# Patient Record
Sex: FEMALE | Race: WHITE | Employment: FULL TIME | URBAN - METROPOLITAN AREA
[De-identification: names, ages, dates, MRNs, and addresses within clinical notes are randomized per-mention and may not be internally consistent; named-entity substitution may affect disease eponyms.]

---

## 2017-07-02 ENCOUNTER — OFFICE VISIT (OUTPATIENT)
Dept: URGENT CARE | Facility: URGENT CARE | Age: 32
End: 2017-07-02
Payer: COMMERCIAL

## 2017-07-02 VITALS
HEART RATE: 95 BPM | BODY MASS INDEX: 20.49 KG/M2 | HEIGHT: 64 IN | SYSTOLIC BLOOD PRESSURE: 98 MMHG | RESPIRATION RATE: 16 BRPM | DIASTOLIC BLOOD PRESSURE: 70 MMHG | OXYGEN SATURATION: 98 % | TEMPERATURE: 98.5 F | WEIGHT: 120 LBS

## 2017-07-02 DIAGNOSIS — L01.00 IMPETIGO: Primary | ICD-10-CM

## 2017-07-02 PROCEDURE — 99213 OFFICE O/P EST LOW 20 MIN: CPT | Performed by: FAMILY MEDICINE

## 2017-07-02 RX ORDER — MUPIROCIN CALCIUM 20 MG/G
CREAM TOPICAL 3 TIMES DAILY
Qty: 15 G | Refills: 0 | Status: SHIPPED | OUTPATIENT
Start: 2017-07-02 | End: 2017-07-09

## 2017-07-02 NOTE — NURSING NOTE
"Chief Complaint   Patient presents with     Urgent Care     Derm Problem     scab on left side of nose not healing, has had it for about 3 weeks.        Initial BP 98/70  Pulse 95  Temp 98.5  F (36.9  C) (Oral)  Resp 16  Ht 5' 4\" (1.626 m)  Wt 120 lb (54.4 kg)  LMP 05/26/2017  SpO2 98%  Breastfeeding? No  BMI 20.6 kg/m2 Estimated body mass index is 20.6 kg/(m^2) as calculated from the following:    Height as of this encounter: 5' 4\" (1.626 m).    Weight as of this encounter: 120 lb (54.4 kg).  Medication Reconciliation: complete  "

## 2017-07-02 NOTE — MR AVS SNAPSHOT
After Visit Summary   7/2/2017    Gabriela Chaudhry    MRN: [id number]           Patient Information     Date Of Birth          1985        Visit Information        Provider Department      7/2/2017 10:30 AM Tara Ventura MD [facility]        Today's Diagnoses     Impetigo    -  1      Care Instructions      Understanding Impetigo  Impetigo is a common bacterial infection of the skin. It most often affects the face, arms, and legs. But it can appear on any part of the body. Anyone can have it, regardless of age. But it is most common in children. Impetigo is very contagious. This means it spreads easily to other people.  How to say it  [id number]   What causes impetigo?  Many types of bacteria live on normal, healthy skin. The bacteria usually don t cause problems. Impetigo happens when bacteria enter the skin through a scratch, break, sore, bite, or irritated spot. They then begin to grow out of control, leading to infection. There are two types of staphylococcus bacteria that cause impetigo. In certain cases, impetigo appears on skin that has no visible break. It may be more likely to occur on skin that has another skin problem, such as eczema. It may also be more common after a cold or other virus.  Symptoms of impetigo  Symptoms of this problem include:    Small, fluid-filled blisters on the skin that may itch, ooze, or crust    A yellow, honey-colored crust on the infected skin    Skin sores that spread with scratching    An itchy rash that spreads with scratching    Swollen lymph nodes  Treatment for impetigo  The goal is to treat the infection and prevent it from spreading to others.    You will likely be given an antibiotic to treat the infection. This may be a cream or ointment called muporicin to put on your skin. If the infection is severe or spreading, you may be given antibiotic medicine to take by mouth. Be sure to use this medicine as directed. Do not  stop using it until you are told to stop, even if your skin gets better. If you stop too soon, the infection may come back and be harder to treat.    Avoid scratching or picking at your sores. It may help to cover affected areas with a bandage.    To prevent spreading the infection, wash your hands often. Avoid sharing personal items, towels, clothes, pillows, and sheets with others. After each use, wash these items in hot water.    Clean the affected skin several times a day. Don t scrub. Instead, soak the area in warm, soapy water. This will help remove the crust that forms. For places that you can't soak, such as the face, place a clean, warm (not hot) washcloth on the affected area. Use a new washcloth and towel each time.  When to call your healthcare provider  Call your healthcare provider right away if you have any of these:    Fever of 100.4 F (38 C) or higher, or as directed    Increasing number of sores or spreading areas of redness after 2 days of treatment with antibiotics    Increasing swelling or pain    Increased amounts of fluid or pus coming from the sores    Unusual drowsiness, weakness, or change in behavior    Loss of appetite or vomiting   Date Last Reviewed: 5/1/2016 2000-2017 [employer]. [address] [place] [address], [place] [address]. All rights reserved. This information is not intended as a substitute for professional medical care. Always follow your healthcare professional's instructions.                Follow-ups after your visit        Your next 10 appointments already scheduled     Jul 03, 2017  5:00 PM CDT   Office Visit with JOEY Verdin CNM   [facility] [place]    [address] [place] [id number] [phone]           Bring a current list of meds and any records pertaining to this visit.  For Physicals, please bring immunization records and any forms needing to be filled out.  Please arrive 10 minutes  "early to complete paperwork.              Who to contact     If you have questions or need follow up information about today's clinic visit or your schedule please contact [facility] directly at [phone].  Normal or non-critical lab and imaging results will be communicated to you by MyChart, letter or phone within 4 business days after the clinic has received the results. If you do not hear from us within 7 days, please contact the clinic through MyChart or phone. If you have a critical or abnormal lab result, we will notify you by phone as soon as possible.  Submit refill requests through [employer] or call your pharmacy and they will forward the refill request to us. Please allow 3 business days for your refill to be completed.          Additional Information About Your Visit        [employer] Information     [employer] lets you send messages to your doctor, view your test results, renew your prescriptions, schedule appointments and more. To sign up, go to [email or web address] . Click on \"Log in\" on the left side of the screen, which will take you to the Welcome page. Then click on \"Sign up Now\" on the right side of the page.     You will be asked to enter the access code listed below, as well as some personal information. Please follow the directions to create your username and password.     Your access code is: [email or web address]  Expires: 2017 11:27 AM     Your access code will  in 90 days. If you need help or a new code, please call your [place] clinic or [phone].        Care EveryWhere ID     This is your Care EveryWhere ID. This could be used by other organizations to access your [place] medical records  [id number]        Your Vitals Were     Pulse Temperature Respirations Height Last Period Pulse Oximetry    95 98.5  F (36.9  C) (Oral) 16 5' 4\" (1.626 m) 2017 98%    Breastfeeding? BMI (Body Mass Index)                No 20.6 kg/m2           Blood Pressure from Last " 3 Encounters:   07/02/17 98/70   09/24/15 101/61   05/24/15 98/56    Weight from Last 3 Encounters:   07/02/17 120 lb (54.4 kg)   09/24/15 119 lb (54 kg)   05/24/15 126 lb (57.2 kg)              Today, you had the following     No orders found for display         Today's Medication Changes          These changes are accurate as of: 7/2/17 11:27 AM.  If you have any questions, ask your nurse or doctor.               Start taking these medicines.        Dose/Directions    mupirocin 2 % cream   Commonly known as:  BACTROBAN   Used for:  Impetigo   Started by:  Tara Ventura MD        Apply topically 3 times daily for 7 days   Quantity:  15 g   Refills:  0         Stop taking these medicines if you haven't already. Please contact your care team if you have questions.     amoxicillin 500 MG capsule   Commonly known as:  AMOXIL   Stopped by:  Tara Ventura MD           BACTRIM PO   Stopped by:  Tara Ventura MD                Where to get your medicines      These medications were sent to [id number] - [place], [place] - [address] AT [facility] [address]  [facility] [address], [place] [address]    Hours:  24-hours Phone:  [phone]     mupirocin 2 % cream                Primary Care Provider Office Phone #    [facility] [phone]       No address on file        Equal Access to Services     CORA CLEMONS AH: Hadii jennifer alvarez hadasho Socadyali, waaxda luqadaha, qaybta kaalmada adeegyada, belén mercado. So [facility] [phone].    ATENCIÓN: Si habla español, tiene a chang disposición servicios gratuitos de asistencia lingüística. Llame al [phone].    We comply with applicable federal civil rights laws and [place] laws. We do not discriminate on the basis of race, color, national origin, age, disability sex, sexual orientation or gender identity.            Thank you!     Thank you for choosing [facility]  for  your care. Our goal is always to provide you with excellent care. Hearing back from our patients is one way we can continue to improve our services. Please take a few minutes to complete the written survey that you may receive in the mail after your visit with us. Thank you!             Your Updated Medication List - Protect others around you: Learn how to safely use, store and throw away your medicines at www.disposemymeds.org.          This list is accurate as of: 7/2/17 11:27 AM.  Always use your most recent med list.                   Brand Name Dispense Instructions for use Diagnosis    * levonorgestrel-ethinyl estradiol 0.15-30 MG-MCG per tablet    NORDETTE    84 tablet    Take 1 tablet by mouth daily    Contraception       * levonorgestrel-ethinyl estradiol 0.1-20 MG-MCG per tablet    TAE KENLESSINA    84 tablet    Take 1 tablet by mouth daily    Dysmenorrhea       mupirocin 2 % cream    BACTROBAN    15 g    Apply topically 3 times daily for 7 days    Impetigo       omeprazole 40 MG capsule    priLOSEC    30 capsule    Take 1 capsule (40 mg) by mouth daily Take 30-60 minutes before a meal.    Esophageal reflux       * Notice:  This list has 2 medication(s) that are the same as other medications prescribed for you. Read the directions carefully, and ask your doctor or other care provider to review them with you.

## 2017-07-02 NOTE — PATIENT INSTRUCTIONS
Understanding Impetigo  Impetigo is a common bacterial infection of the skin. It most often affects the face, arms, and legs. But it can appear on any part of the body. Anyone can have it, regardless of age. But it is most common in children. Impetigo is very contagious. This means it spreads easily to other people.  How to say it  [id number]   What causes impetigo?  Many types of bacteria live on normal, healthy skin. The bacteria usually don t cause problems. Impetigo happens when bacteria enter the skin through a scratch, break, sore, bite, or irritated spot. They then begin to grow out of control, leading to infection. There are two types of staphylococcus bacteria that cause impetigo. In certain cases, impetigo appears on skin that has no visible break. It may be more likely to occur on skin that has another skin problem, such as eczema. It may also be more common after a cold or other virus.  Symptoms of impetigo  Symptoms of this problem include:    Small, fluid-filled blisters on the skin that may itch, ooze, or crust    A yellow, honey-colored crust on the infected skin    Skin sores that spread with scratching    An itchy rash that spreads with scratching    Swollen lymph nodes  Treatment for impetigo  The goal is to treat the infection and prevent it from spreading to others.    You will likely be given an antibiotic to treat the infection. This may be a cream or ointment called muporicin to put on your skin. If the infection is severe or spreading, you may be given antibiotic medicine to take by mouth. Be sure to use this medicine as directed. Do not stop using it until you are told to stop, even if your skin gets better. If you stop too soon, the infection may come back and be harder to treat.    Avoid scratching or picking at your sores. It may help to cover affected areas with a bandage.    To prevent spreading the infection, wash your hands often. Avoid sharing personal items, towels, clothes,  pillows, and sheets with others. After each use, wash these items in hot water.    Clean the affected skin several times a day. Don t scrub. Instead, soak the area in warm, soapy water. This will help remove the crust that forms. For places that you can't soak, such as the face, place a clean, warm (not hot) washcloth on the affected area. Use a new washcloth and towel each time.  When to call your healthcare provider  Call your healthcare provider right away if you have any of these:    Fever of 100.4 F (38 C) or higher, or as directed    Increasing number of sores or spreading areas of redness after 2 days of treatment with antibiotics    Increasing swelling or pain    Increased amounts of fluid or pus coming from the sores    Unusual drowsiness, weakness, or change in behavior    Loss of appetite or vomiting   Date Last Reviewed: 5/1/2016 2000-2017 [employer]. [address] [place] [address], [place] [address]. All rights reserved. This information is not intended as a substitute for professional medical care. Always follow your healthcare professional's instructions.

## 2017-07-02 NOTE — PROGRESS NOTES
"SUBJECTIVE:  Gabriela Chaudhry is a 32 year old female who presents to the clinic today for a rash.  Onset of rash was 3 week(s) ago.   Rash is gradual onset.  Location of the rash: nose. Scab not healing at all  Quality/symptoms of rash: assymptomatic   Symptoms are moderate and rash seems to be not changing over the course of time.  Previous history of a similar rash? No  Recent exposure history: none known    Associated symptoms include: nothing.    Past Medical History:   Diagnosis Date     Other and unspecified ovarian cyst 7/5/11    O[place]ian [place], [place] [facility] likely [place]     Current Outpatient Prescriptions   Medication Sig Dispense Refill     levonorgestrel-ethinyl estradiol (AVIANE,ALESSE,LESSINA) 0.1-20 MG-MCG per tablet Take 1 tablet by mouth daily 84 tablet 3     levonorgestrel-ethinyl estradiol (NORDETTE) 0.15-30 MG-MCG per tablet Take 1 tablet by mouth daily 84 tablet 0     omeprazole (PRILOSEC) 40 MG capsule Take 1 capsule (40 mg) by mouth daily Take 30-60 minutes before a meal. 30 capsule 1     Social History   Substance Use Topics     Smoking status: Never Smoker     Smokeless tobacco: Never Used     Alcohol use No       ROS:  CONSTITUTIONAL:NEGATIVE for fever, chills, change in weight  INTEGUMENTARY/SKIN: POSITIVE for rash on her nose  RESP:NEGATIVE for significant cough or SOB  CV: NEGATIVE for chest pain, palpitations or peripheral edema    EXAM:   BP 98/70  Pulse 95  Temp 98.5  F (36.9  C) (Oral)  Resp 16  Ht 5' 4\" (1.626 m)  Wt 120 lb (54.4 kg)  LMP 05/26/2017  SpO2 98%  Breastfeeding? No  BMI 20.6 kg/m2  GENERAL: alert, no acute distress.  SKIN: Rash description:    Distribution:Perinasal erythema, erosions, and   RESP: lungs clear to auscultation - no rales, rhonchi or wheezes  CV: regular rates and rhythm, normal S1 S2, no murmur noted    ASSESSMENT:  1. Impetigo  - mupirocin (BACTROBAN) 2 % cream; Apply topically 3 times daily for 7 days  Dispense: 15 g; Refill: " 0  -Follow-up with primary clinic if not improving        Tara Ventura MD  [facility]

## 2017-07-06 DIAGNOSIS — N94.6 DYSMENORRHEA: ICD-10-CM

## 2017-07-06 NOTE — TELEPHONE ENCOUNTER
levonorgestrel-ethinyl estradiol (AVIANE,ALEMARKE,LESSINA) 0.1-20 MG-MCG per tablet  Last Written Prescription Date:  2/29/16  Last Fill Quantity: 84,   # refills: 3  Last Office Visit with [facility] prescribing provider: 9/24/15  Future Office visit:    Next 5 appointments (look out 90 days)     Jul 18, 2017  5:00 PM CDT   Office Visit with Jacquie Steele CNM   [facility]    [address] [place] [id number] [phone]                   Routing refill request to provider for review/approval because:  Drug not on the [facility] refill protocol or controlled substance

## 2017-07-07 RX ORDER — LEVONORGESTREL/ETHIN.ESTRADIOL 0.1-0.02MG
1 TABLET ORAL DAILY
Qty: 28 TABLET | Refills: 0 | Status: SHIPPED | OUTPATIENT
Start: 2017-07-07 | End: 2017-07-29

## 2017-07-26 ENCOUNTER — HOSPITAL ENCOUNTER (EMERGENCY)
Facility: CLINIC | Age: 32
Discharge: HOME | End: 2017-07-27
Attending: EMERGENCY MEDICINE | Admitting: EMERGENCY MEDICINE
Payer: COMMERCIAL

## 2017-07-26 ENCOUNTER — APPOINTMENT (OUTPATIENT)
Dept: GENERAL RADIOLOGY | Facility: CLINIC | Age: 32
End: 2017-07-26
Attending: EMERGENCY MEDICINE
Payer: COMMERCIAL

## 2017-07-26 DIAGNOSIS — S00.33XA NASAL CONTUSION: ICD-10-CM

## 2017-07-26 DIAGNOSIS — S50.11XA CONTUSION OF FOREARM, RIGHT: ICD-10-CM

## 2017-07-26 DIAGNOSIS — S50.12XA: ICD-10-CM
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
## 2017-07-26 DIAGNOSIS — S20.219A CHEST WALL CONTUSION, UNSPECIFIED LATERALITY, INITIAL ENCOUNTER: ICD-10-CM
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
## 2017-07-26 PROCEDURE — 93005 ELECTROCARDIOGRAM TRACING: CPT | Performed by: EMERGENCY MEDICINE

## 2017-07-26 PROCEDURE — 93010 ELECTROCARDIOGRAM REPORT: CPT | Mod: Z6 | Performed by: EMERGENCY MEDICINE

## 2017-07-26 PROCEDURE — 99284 EMERGENCY DEPT VISIT MOD MDM: CPT | Mod: 25 | Performed by: EMERGENCY MEDICINE

## 2017-07-26 PROCEDURE — 99285 EMERGENCY DEPT VISIT HI MDM: CPT | Mod: 25 | Performed by: EMERGENCY MEDICINE

## 2017-07-26 PROCEDURE — 36415 COLL VENOUS BLD VENIPUNCTURE: CPT | Performed by: EMERGENCY MEDICINE

## 2017-07-26 PROCEDURE — 71020 XR CHEST 2 VW: CPT

## 2017-07-26 PROCEDURE — 25000132 ZZH RX MED GY IP 250 OP 250 PS 637: Performed by: EMERGENCY MEDICINE

## 2017-07-26 RX ORDER — HYDROCODONE BITARTRATE AND ACETAMINOPHEN 5; 325 MG/1; MG/1
1-2 TABLET ORAL EVERY 4 HOURS PRN
Qty: 15 TABLET | Refills: 0 | Status: SHIPPED | OUTPATIENT
Start: 2017-07-26 | End: 2018-10-14

## 2017-07-26 RX ORDER — HYDROCODONE BITARTRATE AND ACETAMINOPHEN 5; 325 MG/1; MG/1
1 TABLET ORAL ONCE
Status: COMPLETED | OUTPATIENT
Start: 2017-07-26 | End: 2017-07-26

## 2017-07-26 RX ORDER — IBUPROFEN 600 MG/1
600 TABLET, FILM COATED ORAL ONCE
Status: COMPLETED | OUTPATIENT
Start: 2017-07-26 | End: 2017-07-26

## 2017-07-26 RX ORDER — IBUPROFEN 600 MG/1
600 TABLET, FILM COATED ORAL EVERY 8 HOURS PRN
Qty: 20 TABLET | Refills: 0 | Status: SHIPPED | OUTPATIENT
Start: 2017-07-26 | End: 2021-04-06

## 2017-07-26 RX ADMIN — IBUPROFEN 600 MG: 600 TABLET ORAL at 23:01

## 2017-07-26 RX ADMIN — HYDROCODONE BITARTRATE AND ACETAMINOPHEN 1 TABLET: 5; 325 TABLET ORAL at 23:01

## 2017-07-26 ASSESSMENT — ENCOUNTER SYMPTOMS
NECK PAIN: 1
ABDOMINAL PAIN: 1
SHORTNESS OF BREATH: 0
FEVER: 0

## 2017-07-26 NOTE — ED AVS SNAPSHOT
[facility], Emergency Department    [address]    [facility] [address]    Phone:  [phone]    Fax:  [phone]                                       Gabriela Chaudhry   MRN: [id number]    Department:  [facility], Emergency Department   Date of Visit:  7/26/2017           After Visit Summary Signature Page     I have received my discharge instructions, and my questions have been answered. I have discussed any challenges I see with this plan with the nurse or doctor.    ..........................................................................................................................................  Patient/Patient Representative Signature      ..........................................................................................................................................  Patient Representative Print Name and Relationship to Patient    ..................................................               ................................................  Date                                            Time    ..........................................................................................................................................  Reviewed by Signature/Title    ...................................................              ..............................................  Date                                                            Time
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
## 2017-07-27 VITALS
RESPIRATION RATE: 16 BRPM | HEART RATE: 74 BPM | SYSTOLIC BLOOD PRESSURE: 110 MMHG | TEMPERATURE: 99.1 F | OXYGEN SATURATION: 100 % | DIASTOLIC BLOOD PRESSURE: 69 MMHG

## 2017-07-27 LAB — INTERPRETATION ECG - MUSE: NORMAL

## 2017-07-27 NOTE — DISCHARGE INSTRUCTIONS
You will be sore for the next few days  Use pain medication as needed  Follow-up and your clinic or return to the ER if any problems or concerns
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
## 2017-07-29 ENCOUNTER — NURSE TRIAGE (OUTPATIENT)
Dept: NURSING | Facility: CLINIC | Age: 32
End: 2017-07-29

## 2017-07-29 ENCOUNTER — HOSPITAL ENCOUNTER (EMERGENCY)
Facility: CLINIC | Age: 32
Discharge: HOME | End: 2017-07-29
Attending: EMERGENCY MEDICINE | Admitting: EMERGENCY MEDICINE
Payer: COMMERCIAL

## 2017-07-29 VITALS
DIASTOLIC BLOOD PRESSURE: 52 MMHG | SYSTOLIC BLOOD PRESSURE: 106 MMHG | TEMPERATURE: 98.2 F | OXYGEN SATURATION: 99 % | BODY MASS INDEX: 18.8 KG/M2 | RESPIRATION RATE: 16 BRPM | HEART RATE: 69 BPM | WEIGHT: 109.5 LBS
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
## 2017-07-29 DIAGNOSIS — T23.271S: ICD-10-CM

## 2017-07-29 DIAGNOSIS — T23.271A: ICD-10-CM

## 2017-07-29 DIAGNOSIS — T23.272D: ICD-10-CM

## 2017-07-29 DIAGNOSIS — T23.272A: ICD-10-CM
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
## 2017-07-29 PROCEDURE — 99282 EMERGENCY DEPT VISIT SF MDM: CPT | Performed by: EMERGENCY MEDICINE

## 2017-07-29 PROCEDURE — 99282 EMERGENCY DEPT VISIT SF MDM: CPT | Mod: Z6 | Performed by: EMERGENCY MEDICINE

## 2017-07-29 RX ORDER — LEVONORGESTREL AND ETHINYL ESTRADIOL 0.15-0.03
1 KIT ORAL DAILY
COMMUNITY
End: 2021-04-06

## 2017-07-29 ASSESSMENT — ENCOUNTER SYMPTOMS
SHORTNESS OF BREATH: 0
FEVER: 0
ABDOMINAL PAIN: 0
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
## 2017-07-29 NOTE — DISCHARGE INSTRUCTIONS
Second-Degree Burn  A burn occurs when skin is exposed to too much heat, sun, or harsh chemicals. A second-degree burn (partial-thickness burn) is deeper than a first-degree burn (superficial burn). It usually causes a blister to form. The blister may remain intact and gradually go away on its own. Or it may break open. The goal of treatment is to relieve pain and stop infection while the burn heals.  Home care  Use pain medicine as directed. If no pain medicine was prescribed, you may use over-the-counter medicine to control pain. If you have chronic liver or kidney disease, talk with your healthcare provider before using acetaminophen or ibuprofen. Also talk with your provider if you've had a stomach ulcer or GI bleeding.  General care    On the first day, you may put a cool compress on the wound to ease pain. A cool compress is a small towel soaked in cool water.    If you were sent home with the blister intact, don't break the blister. The risk for infection is greater if the blister breaks. If a bandage was applied, change it once a day, unless told otherwise. If the bandage becomes wet or soiled, change it as soon as you can.    Sometimes an infection may occur even with proper treatment. Check the burn daily for the signs of infection listed below.    Eat more calories and protein until your wound is healed.    Wear a hat, sunscreen, and long sleeves while in the sun to protect the skin.    Don't pick or scratch at the wound. Use over-the-counter medicines like diphenhydramine for itching.    Avoid tight-fitting clothes.  To change a bandage:    Wash your hands.    Take off the old bandage. If the bandage sticks, soak it off under warm running water.    Once the bandage is off, gently wash the burn area with mild soap and warm water to remove any cream, ointment, ooze, or scab. You may do this in a sink, under a tub faucet, or in the shower. Rinse off the soap and gently pat dry with a clean towel.    Check  for signs of infection listed below.    Put any prescribed antibiotic cream or ointment on the wound.    Cover the burn with nonstick gauze. Then wrap it with the bandage material.  Follow-up care  Follow up with your healthcare provider, or as advised.  When to seek medical advice  Call your healthcare provider right away if you have any of these signs of infection:    Fever of 100.4 F (38 C) or higher, or as directed by your healthcare provider    Pain that gets worse    Redness or swelling that gets worse    Pus comes from the burn    Red streaks in your skin coming from the burn    Wound doesn't appear to be healing    Nausea or vomiting   Date Last Reviewed: 1/1/2017 2000-2017 [employer]. [address] [place] [address], [place] [id number]. All rights reserved. This information is not intended as a substitute for professional medical care. Always follow your healthcare professional's instructions.          Burn Wound: Wound Check, No Infection  Your burn is healing as expected.  Home care  Follow these guidelines when caring for yourself at home:    If a bandage was put on, you should change it once a day, unless told otherwise. If the bandage sticks, soak it off in warm water. A bandage left in place too long can make an infection worse.    Wash the area with soap and water to remove all cream, ointment, ooze, or scab. You may do this in a sink, under a tub faucet, or in the shower. Rinse off the soap and pat dry with a clean towel. Look for signs of infection.    Put cream or ointment on the wound to prevent infection and to keep the bandage from sticking.    Cover the burn with nonstick gauze. Then wrap it with the bandage material.    If the bandage becomes wet or soiled, change it as soon as you can.    You may use acetaminophen or ibuprofen to control pain, unless another pain medicine was prescribed. If you have chronic liver or kidney disease, talk with your healthcare provider before using  these medicines. Also talk with your provider if you ve had a stomach ulcer or GI (gastrointestinal) bleeding.    Ask your provider if you need a tetanus shot.  Follow-up care  Follow up with your healthcare provider, or as advised. Most burns heal without infection. Sometimes an infection may occur even with proper treatment. So check the burn every day for the signs of infection listed below.  When to seek medical advice  Call your healthcare provider right away if any of these occur:    Pain in the wound gets worse    Redness or swelling gets worse    Pus comes from the wound    Fever of 100.4 F (38 C) or higher, or as directed by your healthcare provider  Date Last Reviewed: 1/1/2017 2000-2017 [employer]. [address] [place] [address], [place] [address]. All rights reserved. This information is not intended as a substitute for professional medical care. Always follow your healthcare professional's instructions.
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
## 2017-07-29 NOTE — ED AVS SNAPSHOT
[facility], Emergency Department    [address]    [facility] [address]    Phone:  [phone]    Fax:  [phone]                                       Gabriela Chaudhry   MRN: [id number]    Department:  [facility], Emergency Department   Date of Visit:  7/29/2017           After Visit Summary Signature Page     I have received my discharge instructions, and my questions have been answered. I have discussed any challenges I see with this plan with the nurse or doctor.    ..........................................................................................................................................  Patient/Patient Representative Signature      ..........................................................................................................................................  Patient Representative Print Name and Relationship to Patient    ..................................................               ................................................  Date                                            Time    ..........................................................................................................................................  Reviewed by Signature/Title    ...................................................              ..............................................  Date                                                            Time

## 2017-09-01 ENCOUNTER — OFFICE VISIT (OUTPATIENT)
Dept: URGENT CARE | Facility: URGENT CARE | Age: 32
End: 2017-09-01
Payer: COMMERCIAL

## 2017-09-01 VITALS
OXYGEN SATURATION: 99 % | RESPIRATION RATE: 16 BRPM | DIASTOLIC BLOOD PRESSURE: 56 MMHG | TEMPERATURE: 98 F | BODY MASS INDEX: 18.61 KG/M2 | SYSTOLIC BLOOD PRESSURE: 92 MMHG | WEIGHT: 109 LBS | HEART RATE: 67 BPM | HEIGHT: 64 IN

## 2017-09-01 DIAGNOSIS — K13.0 ANGULAR CHEILITIS: Primary | ICD-10-CM

## 2017-09-01 PROCEDURE — 99213 OFFICE O/P EST LOW 20 MIN: CPT | Performed by: FAMILY MEDICINE

## 2017-09-01 NOTE — MR AVS SNAPSHOT
"              After Visit Summary   2017    Gabriela Chaudhry    MRN: [id number]           Patient Information     Date Of Birth          1985        Visit Information        Provider Department      2017 7:15 PM Freddie Rice MD [facility]        Today's Diagnoses     Angular cheilitis    -  1       Follow-ups after your visit        Who to contact     If you have questions or need follow up information about today's clinic visit or your schedule please contact [facility] directly at [phone].  Normal or non-critical lab and imaging results will be communicated to you by LearnUphart, letter or phone within 4 business days after the clinic has received the results. If you do not hear from us within 7 days, please contact the clinic through LearnUphart or phone. If you have a critical or abnormal lab result, we will notify you by phone as soon as possible.  Submit refill requests through SergeMD or call your pharmacy and they will forward the refill request to us. Please allow 3 business days for your refill to be completed.          Additional Information About Your Visit        MyChart Information     SergeMD lets you send messages to your doctor, view your test results, renew your prescriptions, schedule appointments and more. To sign up, go to [email or web address] . Click on \"Log in\" on the left side of the screen, which will take you to the Welcome page. Then click on \"Sign up Now\" on the right side of the page.     You will be asked to enter the access code listed below, as well as some personal information. Please follow the directions to create your username and password.     Your access code is: [email or web address]  Expires: 2017 11:27 AM     Your access code will  in 90 days. If you need help or a new code, please call your [facility] clinic or [phone].        Care EveryWhere ID     This is your Care EveryWhere ID. This could be used " "by other organizations to access your [place] medical records  [id number]        Your Vitals Were     Pulse Temperature Respirations Height Last Period Pulse Oximetry    67 98  F (36.7  C) (Oral) 16 5' 4\" (1.626 m) 08/16/2017 99%    Breastfeeding? BMI (Body Mass Index)                No 18.71 kg/m2           Blood Pressure from Last 3 Encounters:   09/01/17 92/56   07/29/17 106/52   07/26/17 110/69    Weight from Last 3 Encounters:   09/01/17 109 lb (49.4 kg)   07/29/17 109 lb 8 oz (49.7 kg)   07/02/17 120 lb (54.4 kg)              Today, you had the following     No orders found for display       Primary Care Provider Office Phone #    [facility] [phone]       No address on file        Equal Access to Services     CORA CLEMONS : Hadii jennifer ulloa Sodennis, waaxda luqadaha, qaybta kaalmada carla, belén smith . So [facility] [phone].    ATENCIÓN: Si habla español, tiene a chang disposición servicios gratuitos de asistencia lingüística. Llame al [phone].    We comply with applicable federal civil rights laws and [place] laws. We do not discriminate on the basis of race, color, national origin, age, disability sex, sexual orientation or gender identity.            Thank you!     Thank you for choosing [facility]  for your care. Our goal is always to provide you with excellent care. Hearing back from our patients is one way we can continue to improve our services. Please take a few minutes to complete the written survey that you may receive in the mail after your visit with us. Thank you!             Your Updated Medication List - Protect others around you: Learn how to safely use, store and throw away your medicines at www.disposemymeds.org.          This list is accurate as of: 9/1/17  7:38 PM.  Always use your most recent med list.                   Brand Name Dispense Instructions for use Diagnosis    CLARAVIS PO      Take " 20 mg by mouth daily        HYDROcodone-acetaminophen 5-325 MG per tablet    NORCO    15 tablet    Take 1-2 tablets by mouth every 4 hours as needed for moderate to severe pain        ibuprofen 600 MG tablet    ADVIL/MOTRIN    20 tablet    Take 1 tablet (600 mg) by mouth every 8 hours as needed for moderate pain        levonorgestrel-ethinyl estradiol 0.15-30 MG-MCG per tablet    LIZETTE     Take 1 tablet by mouth daily

## 2017-09-02 NOTE — NURSING NOTE
"Chief Complaint   Patient presents with     Urgent Care     Mouth/Lip Problem     sore on right corner of mouth x 2 weeks, not getting better.        Initial BP 92/56  Pulse 67  Temp 98  F (36.7  C) (Oral)  Resp 16  Ht 5' 4\" (1.626 m)  Wt 109 lb (49.4 kg)  LMP 08/16/2017  SpO2 99%  Breastfeeding? No  BMI 18.71 kg/m2 Estimated body mass index is 18.71 kg/(m^2) as calculated from the following:    Height as of this encounter: 5' 4\" (1.626 m).    Weight as of this encounter: 109 lb (49.4 kg).  Medication Reconciliation: complete  "

## 2017-09-02 NOTE — PROGRESS NOTES
Subjective: Patient has been battling for 3 weeks an irritated crusty formation at the corner of her mouth on the right. She's never had it before. A few months ago she had what was possibly impetigo by her left nostril and got Bactroban and it went away quickly. She did try the Bactroban again but it didn't help. She tried hydrocortisone but it didn't help. She tried an antifungal and it didn't help. In the morning it's particularly crusty.    Objective: Typical angular cheilitis on the right, red irritated    Assessment and plan: Angular cheilitis. Often multifactorial. I told her you to use all 3 of her sats at once about 3 times a day and see if it gets better. It does not she should see dermatology.

## 2018-06-11 ENCOUNTER — TELEPHONE (OUTPATIENT)
Dept: FAMILY MEDICINE | Facility: CLINIC | Age: 33
End: 2018-06-11

## 2018-06-11 NOTE — TELEPHONE ENCOUNTER
Reason for Call:  Medication or medication refill:    Do you use a [facility]?  Name of the pharmacy and phone number for the current request:  [id number] - [place], [place] - [address] AT [facility] [address]    Name of the medication requested: Malaria pills    Other request: Patient's mother called to request malaria pills for the patient as she is currently in [place]. States the patient's sister was seen at  and got a prescription for this but the patient went elsewhere and forgot to request the medication. Mother states she would like it sent to the pharmacy below before she leaves for [facility] on Wednesday, 6/13/2018 so she can bring it to her, otherwise she will attempt to find the name of a pharmacy in [place]. States these pills are to be taken once actually arrived in the country. States if this is unable to be prescribed, she would like to be recommended names of malaria medications she can try to get in [place]. Please assist. Thanks!    Can we leave a detailed message on this number? YES    Phone number patient can be reached at: Other phone number: [place] [phone]    Best Time: Any, ASAP    Call taken on 6/11/2018 at 1:48 PM by Debi Stockton

## 2018-06-11 NOTE — TELEPHONE ENCOUNTER
Mother calls and wanted a list of the malaria drugs that are given so she can give info to her daughter . [facility] site had resources and got info from there.  Her daughter has arrived in [place] and will go to a local clinic to get malaria RX if appropriate.  Does have mosquito repellant.  Cathy Chavarria RN

## 2018-10-14 ENCOUNTER — HOSPITAL ENCOUNTER (EMERGENCY)
Facility: CLINIC | Age: 33
Discharge: HOME | End: 2018-10-14
Attending: EMERGENCY MEDICINE | Admitting: EMERGENCY MEDICINE
Payer: COMMERCIAL

## 2018-10-14 ENCOUNTER — NURSE TRIAGE (OUTPATIENT)
Dept: NURSING | Facility: CLINIC | Age: 33
End: 2018-10-14

## 2018-10-14 VITALS
RESPIRATION RATE: 16 BRPM | SYSTOLIC BLOOD PRESSURE: 104 MMHG | BODY MASS INDEX: 19.4 KG/M2 | OXYGEN SATURATION: 100 % | DIASTOLIC BLOOD PRESSURE: 62 MMHG | TEMPERATURE: 98 F | WEIGHT: 113 LBS | HEART RATE: 68 BPM

## 2018-10-14 DIAGNOSIS — R07.89 ATYPICAL CHEST PAIN: ICD-10-CM

## 2018-10-14 LAB
ANION GAP SERPL CALCULATED.3IONS-SCNC: 9 MMOL/L (ref 3–14)
BASOPHILS # BLD AUTO: 0 10E9/L (ref 0–0.2)
BASOPHILS NFR BLD AUTO: 0.3 %
BUN SERPL-MCNC: 15 MG/DL (ref 7–30)
CALCIUM SERPL-MCNC: 8.9 MG/DL (ref 8.5–10.1)
CHLORIDE SERPL-SCNC: 104 MMOL/L (ref 94–109)
CO2 SERPL-SCNC: 27 MMOL/L (ref 20–32)
CREAT SERPL-MCNC: 0.66 MG/DL (ref 0.52–1.04)
D DIMER PPP FEU-MCNC: 0.3 UG/ML FEU (ref 0–0.5)
DIFFERENTIAL METHOD BLD: NORMAL
EOSINOPHIL # BLD AUTO: 0.1 10E9/L (ref 0–0.7)
EOSINOPHIL NFR BLD AUTO: 0.9 %
ERYTHROCYTE [DISTWIDTH] IN BLOOD BY AUTOMATED COUNT: 11.2 % (ref 10–15)
GFR SERPL CREATININE-BSD FRML MDRD: >90 ML/MIN/1.7M2
GLUCOSE SERPL-MCNC: 83 MG/DL (ref 70–99)
HCT VFR BLD AUTO: 38.1 % (ref 35–47)
HGB BLD-MCNC: 12.9 G/DL (ref 11.7–15.7)
IMM GRANULOCYTES # BLD: 0 10E9/L (ref 0–0.4)
IMM GRANULOCYTES NFR BLD: 0.2 %
INTERPRETATION ECG - MUSE: NORMAL
LYMPHOCYTES # BLD AUTO: 2.5 10E9/L (ref 0.8–5.3)
LYMPHOCYTES NFR BLD AUTO: 39.6 %
MCH RBC QN AUTO: 30.9 PG (ref 26.5–33)
MCHC RBC AUTO-ENTMCNC: 33.9 G/DL (ref 31.5–36.5)
MCV RBC AUTO: 91 FL (ref 78–100)
MONOCYTES # BLD AUTO: 0.4 10E9/L (ref 0–1.3)
MONOCYTES NFR BLD AUTO: 5.8 %
NEUTROPHILS # BLD AUTO: 3.4 10E9/L (ref 1.6–8.3)
NEUTROPHILS NFR BLD AUTO: 53.2 %
NRBC # BLD AUTO: 0 10*3/UL
NRBC BLD AUTO-RTO: 0 /100
PLATELET # BLD AUTO: 215 10E9/L (ref 150–450)
POTASSIUM SERPL-SCNC: 3.8 MMOL/L (ref 3.4–5.3)
RBC # BLD AUTO: 4.17 10E12/L (ref 3.8–5.2)
SODIUM SERPL-SCNC: 140 MMOL/L (ref 133–144)
TROPONIN I SERPL-MCNC: <0.015 UG/L (ref 0–0.04)
WBC # BLD AUTO: 6.4 10E9/L (ref 4–11)

## 2018-10-14 PROCEDURE — 93005 ELECTROCARDIOGRAM TRACING: CPT | Performed by: EMERGENCY MEDICINE

## 2018-10-14 PROCEDURE — 99284 EMERGENCY DEPT VISIT MOD MDM: CPT | Mod: 25 | Performed by: EMERGENCY MEDICINE

## 2018-10-14 PROCEDURE — 85025 COMPLETE CBC W/AUTO DIFF WBC: CPT | Performed by: EMERGENCY MEDICINE

## 2018-10-14 PROCEDURE — 25000125 ZZHC RX 250: Performed by: EMERGENCY MEDICINE

## 2018-10-14 PROCEDURE — 93010 ELECTROCARDIOGRAM REPORT: CPT | Mod: Z6 | Performed by: EMERGENCY MEDICINE

## 2018-10-14 PROCEDURE — 84484 ASSAY OF TROPONIN QUANT: CPT | Performed by: EMERGENCY MEDICINE

## 2018-10-14 PROCEDURE — 94640 AIRWAY INHALATION TREATMENT: CPT | Performed by: EMERGENCY MEDICINE

## 2018-10-14 PROCEDURE — 80048 BASIC METABOLIC PNL TOTAL CA: CPT | Performed by: EMERGENCY MEDICINE

## 2018-10-14 PROCEDURE — 85379 FIBRIN DEGRADATION QUANT: CPT | Performed by: EMERGENCY MEDICINE

## 2018-10-14 RX ORDER — ALBUTEROL SULFATE 90 UG/1
2 AEROSOL, METERED RESPIRATORY (INHALATION) EVERY 6 HOURS PRN
Qty: 1 INHALER | Refills: 0 | Status: SHIPPED | OUTPATIENT
Start: 2018-10-14 | End: 2021-04-06

## 2018-10-14 RX ORDER — SPIRONOLACTONE 25 MG/1
TABLET ORAL
COMMUNITY
Start: 2018-05-03 | End: 2020-09-04

## 2018-10-14 RX ORDER — IPRATROPIUM BROMIDE AND ALBUTEROL SULFATE 2.5; .5 MG/3ML; MG/3ML
3 SOLUTION RESPIRATORY (INHALATION) ONCE
Status: COMPLETED | OUTPATIENT
Start: 2018-10-14 | End: 2018-10-14

## 2018-10-14 RX ADMIN — IPRATROPIUM BROMIDE AND ALBUTEROL SULFATE 3 ML: .5; 3 SOLUTION RESPIRATORY (INHALATION) at 02:57

## 2018-10-14 NOTE — ED TRIAGE NOTES
"Pt. sick for 1 week with chest pressure.  Pt. states pressure worse tonight.  Having hard time,  \"getting air out of lungs.\"  Stated she got dizzy once today.  "

## 2018-10-14 NOTE — ED AVS SNAPSHOT
[facility], Emergency Department    [address]    [facility] [address]    Phone:  [phone]    Fax:  [phone]                                       Gabriela Chaudhry   MRN: [id number]    Department:  [facility], Emergency Department   Date of Visit:  10/14/2018           After Visit Summary Signature Page     I have received my discharge instructions, and my questions have been answered. I have discussed any challenges I see with this plan with the nurse or doctor.    ..........................................................................................................................................  Patient/Patient Representative Signature      ..........................................................................................................................................  Patient Representative Print Name and Relationship to Patient    ..................................................               ................................................  Date                                   Time    ..........................................................................................................................................  Reviewed by Signature/Title    ...................................................              ..............................................  Date                                               Time          22EPIC Rev 08/18

## 2018-10-14 NOTE — DISCHARGE INSTRUCTIONS
Please make an appointment to follow up with Your Primary Care Provider in a few days if symptoms not improving.     Return to the ED if you are having difficulty breathing, worsening symptoms, or any other urgent/life-threatening concerns.

## 2018-10-14 NOTE — ED AVS SNAPSHOT
[facility], Emergency Department    [address]    [place] [address]    Phone:  [phone]    Fax:  [phone]                                       Gabriela Chaudhry   MRN: [id number]    Department:  [facility], Emergency Department   Date of Visit:  10/14/2018           Patient Information     Date Of Birth          1985        Your diagnoses for this visit were:     Atypical chest pain        You were seen by Ricco Torres MD.        Discharge Instructions       Please make an appointment to follow up with Your Primary Care Provider in a few days if symptoms not improving.     Return to the ED if you are having difficulty breathing, worsening symptoms, or any other urgent/life-threatening concerns.       24 Hour Appointment Hotline       To make an appointment at any [facility] clinic, call [phone] ([phone]). If you don't have a family doctor or clinic, we will help you find one. [facility] clinics are conveniently located to serve the needs of you and your family.             Review of your medicines      START taking        Dose / Directions Last dose taken    albuterol 108 (90 Base) MCG/ACT inhaler   Commonly known as:  PROAIR HFA/PROVENTIL HFA/VENTOLIN HFA   Dose:  2 puff   Quantity:  1 Inhaler        Inhale 2 puffs into the lungs every 6 hours as needed for shortness of breath / dyspnea or wheezing   Refills:  0          Our records show that you are taking the medicines listed below. If these are incorrect, please call your family doctor or clinic.        Dose / Directions Last dose taken    CLARAVIS PO   Dose:  20 mg        Take 20 mg by mouth daily   Refills:  0        ibuprofen 600 MG tablet   Commonly known as:  ADVIL/MOTRIN   Dose:  600 mg   Quantity:  20 tablet        Take 1 tablet (600 mg) by mouth every 8 hours as needed for moderate pain   Refills:  0        levonorgestrel-ethinyl estradiol 0.15-30 MG-MCG per tablet   Commonly known as:  LIZETTE  "  Dose:  1 tablet        Take 1 tablet by mouth daily   Refills:  0        spironolactone 25 MG tablet   Commonly known as:  ALDACTONE        TK 2 TS PO QD   Refills:  0                Prescriptions were sent or printed at these locations (1 Prescription)                   Other Prescriptions                Printed at Department/Unit printer (1 of 1)         albuterol (PROAIR HFA/PROVENTIL HFA/VENTOLIN HFA) 108 (90 Base) MCG/ACT inhaler                Procedures and tests performed during your visit     Procedure/Test Number of Times Performed    Basic metabolic panel 1    CBC with platelets differential 1    D dimer quantitative 1    EKG 12 lead 2    Peripheral IV catheter 1    Troponin I 1      Orders Needing Specimen Collection     None      Pending Results     No orders found from 10/12/2018 to 10/15/2018.            Pending Culture Results     No orders found from 10/12/2018 to 10/15/2018.            Pending Results Instructions     If you had any lab results that were not finalized at the time of your Discharge, you can call the ED Lab Result RN at [phone]. You will be contacted by this team for any positive Lab results or changes in treatment. The nurses are available 7 days a week from 10A to 6:30P.  You can leave a message 24 hours per day and they will return your call.        Thank you for choosing [facility]       Thank you for choosing [facility] for your care. Our goal is always to provide you with excellent care. Hearing back from our patients is one way we can continue to improve our services. Please take a few minutes to complete the written survey that you may receive in the mail after you visit with us. Thank you!        PublicStuffhart Information     Friday lets you send messages to your doctor, view your test results, renew your prescriptions, schedule appointments and more. To sign up, go to [email or web address] . Click on \"Log in\" on the left side of the screen, which will take you to the " "Welcome page. Then click on \"Sign up Now\" on the right side of the page.     You will be asked to enter the access code listed below, as well as some personal information. Please follow the directions to create your username and password.     Your access code is: [email or web address]  Expires: 2019  4:12 AM     Your access code will  in 90 days. If you need help or a new code, please call your [facility] clinic or [phone].        Care EveryWhere ID     This is your Care EveryWhere ID. This could be used by other organizations to access your [facility] medical records  [id number]        Equal Access to Services     CORA CLEMONS : Susi Bruno, lori barksdale, andre aguirre, belén mercado. So [facility] [phone].    ATENCIÓN: Si habla español, tiene a chang disposición servicios gratuitos de asistencia lingüística. Llame al [phone].    We comply with applicable federal civil rights laws and [place] laws. We do not discriminate on the basis of race, color, national origin, age, disability, sex, sexual orientation, or gender identity.            After Visit Summary       This is your record. Keep this with you and show to your community pharmacist(s) and doctor(s) at your next visit.                  "

## 2018-10-14 NOTE — TELEPHONE ENCOUNTER
"  Reason for Disposition    Patient sounds very sick or weak to the triager    Additional Information    Negative: [1] Breathing stopped AND [2] hasn't returned    Negative: Choking on something    Negative: Severe difficulty breathing (e.g., struggling for each breath, speaks in single words)    Negative: Bluish lips, tongue, or face now    Negative: Difficult to awaken or acting confused  (e.g., disoriented, slurred speech)    Negative: Passed out (i.e., lost consciousness, collapsed and was not responding)    Negative: Wheezing started suddenly after medicine, an allergic food or bee sting    Negative: Stridor    Negative: Slow, shallow and weak breathing    Negative: Sounds like a life-threatening emergency to the triager    Negative: [1] Wheezing (high pitched whistling sound) AND [2] previous asthma attacks or use of asthma medicines    Negative: [1] Difficulty breathing AND [2] only present when coughing    Negative: [1] Difficulty breathing AND [2] only from stuffy or runny nose    Negative: [1] MODERATE difficulty breathing (e.g., speaks in phrases, SOB even at rest, pulse 100-120) AND [2] NEW-onset or WORSE than normal    Negative: Wheezing can be heard across the room    Negative: Drooling or spitting out saliva (because can't swallow)    Negative: History of prior \"blood clot\" in leg or lungs (i.e., deep vein thrombosis, pulmonary embolism)    Negative: History of inherited increased risk of blood clots (e.g., Factor 5 Leiden, Anti-thrombin 3, Protein C or Protein S deficiency, Prothrombin mutation)    Negative: Recent illness requiring prolonged bedrest (i.e., immobilization)    Negative: Hip or leg fracture in past 2 months (e.g., had cast on leg or ankle)    Negative: Major surgery in the past month    Negative: Recent long-distance travel with prolonged time in car, bus, plane, or train (i.e., within past 2 weeks; 6 or  more hours duration)    Negative: Extra heart beats OR irregular heart beating   " "(i.e., \"palpitations\")    Negative: Fever > 103 F (39.4 C)    Negative: [1] Fever > 101 F (38.3 C) AND [2] age > 60    Negative: [1] Fever > 101 F (38.3 C) AND [2] bedridden (e.g., nursing home patient, CVA, chronic illness, recovering from surgery)    Negative: [1] Fever > 100.5 F (38.1 C) AND [2] diabetes mellitus or weak immune system (e.g., HIV positive, cancer chemo, splenectomy, organ transplant, chronic steroids)    Negative: [1] Periods where breathing stops and then resumes normally AND [2] bedridden (e.g., nursing home patient, CVA)    Negative: Pregnant or postpartum (< 1 month since delivery)    Negative: Patient sounds very sick or weak to the triager    Chest pain    Negative: Severe difficulty breathing (e.g., struggling for each breath, speaks in single words)    Negative: Difficult to awaken or acting confused (e.g., disoriented, slurred speech)    Negative: Shock suspected (e.g., cold/pale/clammy skin, too weak to stand, low BP, rapid pulse)    Negative: [1] Chest pain lasts > 5 minutes AND [2] history of heart disease  (i.e., heart attack, bypass surgery, angina, angioplasty, CHF; not just a heart murmur)    Negative: [1] Chest pain lasts > 5 minutes AND [2] described as crushing, pressure-like, or heavy    Negative: [1] Chest pain lasts > 5 minutes AND [2] age > 50    Negative: [1] Chest pain lasts > 5 minutes AND [2] age > 30 AND [3] at least one cardiac risk factor (i.e., hypertension, diabetes, obesity, smoker or strong family history of heart disease)    Negative: [1] Chest pain lasts > 5 minutes AND [2] not relieved with nitroglycerin    Negative: Passed out (i.e., lost consciousness, collapsed and was not responding)    Negative: Heart beating < 50 beats per minute OR > 140 beats per minute    Negative: Visible sweat on face or sweat dripping down face    Negative: Sounds like a life-threatening emergency to the triager    Negative: Followed a chest injury    Negative: SEVERE chest pain    " "Negative: [1] Intermittent  chest pain or \"angina\" AND [2] increasing in severity or frequency  (Exception: pains lasting a few seconds)    Negative: Pain also present in shoulder(s) or arm(s) or jaw  (Exception: pain is clearly made worse by movement)    Negative: Difficulty breathing    Negative: Dizziness or lightheadedness    Negative: Coughing up blood    Negative: Cocaine use within last 3 days    Negative: History of prior \"blood clot\" in leg or lungs (i.e., deep vein thrombosis, pulmonary embolism)    Negative: Recent illness requiring prolonged bedrest (i.e., immobilization)    Negative: Hip or leg fracture in past 2 months (e.g., had cast on leg or ankle)    Negative: Major surgery in the past month    Negative: Recent long-distance travel with prolonged time in car, bus, plane, or train (i.e., within past 2 weeks; 6 or  more hours duration)    Negative: Chest pain lasts > 5 minutes (Exceptions: chest pain occurring > 3 days ago and now asymptomatic; same as previously diagnosed heartburn and has accompanying sour taste in mouth)    Negative: Taking a deep breath makes pain worse    Protocols used: CHEST PAIN-ADULT-AH, BREATHING DIFFICULTY-ADULT-AH  Caller states she is having chest soreness and pressure. Caller states she is having some shortness of breath and thinks she may have asthma. Caller denies any fever and was seen by an outside provider and a chest x-ray was done without the results known. Triage guidelines recommend to go to ED. Caller verbalized and understands directives.  "

## 2018-10-14 NOTE — ED PROVIDER NOTES
"  History     Chief Complaint   Patient presents with     Chest Pain     HPI  Gabriela Chaudhry is a 33 year old female who presents with chest discomfort. \"pressure\" quality. Feels like she can't get air out, has to take several deep breaths to feel better. No clear aggravating factors. Mild cough, non-productive. Patient went to her PCP 2 days ago, had a CXR and ECG that were reportedly normal. Had somewhat similar symptoms in the past, had resolution with inhaler during those episodes. No recent travel/immobilization, no leg swelling, no prior DVT/PE. Patient does use an estrogen-containing OCP.     I have reviewed the Medications, Allergies, Past Medical and Surgical History, and Social History in the Epic system.    Review of Systems  A complete review of systems was performed with pertinent positives and negatives noted in the HPI, and all other systems negative.     Physical Exam   BP: 106/65  Pulse: 68  Heart Rate: 86  Temp: 97.8  F (36.6  C)  Resp: 16  Weight: 51.3 kg (113 lb)  SpO2: 100 %      Physical Exam  /62  Pulse 68  Temp 98  F (36.7  C) (Oral)  Resp 16  Wt 51.3 kg (113 lb)  LMP 09/17/2018  SpO2 100%  BMI 19.4 kg/m2  General: well-appearing, no acute distress  HENT: MMM, no oropharyngeal lesions  Eyes: PERRL, normal sclerae, no conjunctival pallor  Cardio: RRR, normal heart sounds, extremities well perfused  Resp: Normal work of breathing, clear breath sounds  Abdomen: no tenderness, non-distended, no rebound, no guarding  Neuro: alert and fully oriented. CN II-XII grossly intact. Grossly normal strength and sensation in all extremities.   MSK: no deformities.   Integumentary/Skin: no rash, normal color  Psych: normal affect, normal behavior    ED Course     ED Course     Procedures             EKG Interpretation:      Interpreted by Ricco Torres  Time reviewed: 0255  Symptoms at time of EKG: chest pressure   Rhythm: normal sinus   Rate: Normal  Axis: Normal  Ectopy: " none  Conduction: normal  ST Segments/ T Waves: No ST-T wave changes and No acute ischemic changes  Q Waves: none  Comparison to prior: Unchanged from 7/26/2017    Clinical Impression: normal EKG    Critical Care time:  none       Labs Ordered and Resulted from Time of ED Arrival Up to the Time of Departure from the ED   D DIMER QUANTITATIVE   TROPONIN I   CBC WITH PLATELETS DIFFERENTIAL   BASIC METABOLIC PANEL   PERIPHERAL IV CATHETER            Assessments & Plan (with Medical Decision Making)   Patient presenting with chest pressure for 1 week that improves after several deep breaths. Vitals in the ED wnl. Initial differential diagnosis includes but not limited to asthma, PE, ACS, GERD.     ECG without evidence of acute ischemia, troponin negative - ACS very unlikely. Care Everywhere results reviewed - 2 days ago, CXR without abnormality, UPT negative, and ECG normal. no indication to repeat CXR given she has not had significant change in symptoms since then. Overall low risk for PE with OCP being only risk factor, D-dimer negative - PE very unlikely. The patient was given duoneb for chest tightness with improvement in symptoms upon reassessment. Patient did note that similar symptoms in the past were helped significantly with an inhaler.     The complete clinical picture is most consistent with likely bronchospasm. After counseling on the diagnosis, work-up, and treatment plan, the patient was discharged to home. albuterol prescription provided. The patient was advised to follow-up with her PCP in a few days if not significantly improved. The patient was advised to return to the ED if worsening symptoms, or if there are any urgent/life-threatening concerns.       Clinical Impression:  Atypical chest pain       Ricco Torres MD  Emergency Medicine       I have reviewed the nursing notes.    I have reviewed the findings, diagnosis, plan and need for follow up with the patient.    Discharge Medication List as  of 10/14/2018  4:13 AM      START taking these medications    Details   albuterol (PROAIR HFA/PROVENTIL HFA/VENTOLIN HFA) 108 (90 Base) MCG/ACT inhaler Inhale 2 puffs into the lungs every 6 hours as needed for shortness of breath / dyspnea or wheezing, Disp-1 Inhaler, R-0, Local Print             Final diagnoses:   Atypical chest pain       10/14/2018   [facility], [place], EMERGENCY DEPARTMENT     Ricco Torres MD  10/14/18 1220

## 2019-10-18 ENCOUNTER — TRANSFERRED RECORDS (OUTPATIENT)
Dept: HEALTH INFORMATION MANAGEMENT | Facility: CLINIC | Age: 34
End: 2019-10-18

## 2019-10-18 LAB
HEP C HIM: NORMAL
HIV 1&2 EXT: NORMAL
HPV ABSTRACT: NORMAL
PAP-ABSTRACT: NORMAL

## 2020-02-25 ENCOUNTER — HOSPITAL ENCOUNTER (EMERGENCY)
Facility: CLINIC | Age: 35
Discharge: HOME | End: 2020-02-26
Attending: EMERGENCY MEDICINE | Admitting: EMERGENCY MEDICINE
Payer: COMMERCIAL

## 2020-02-25 VITALS
RESPIRATION RATE: 20 BRPM | TEMPERATURE: 98.1 F | OXYGEN SATURATION: 100 % | SYSTOLIC BLOOD PRESSURE: 108 MMHG | HEART RATE: 84 BPM | DIASTOLIC BLOOD PRESSURE: 65 MMHG

## 2020-02-25 DIAGNOSIS — T50.905A ADVERSE EFFECT OF DRUG, INITIAL ENCOUNTER: ICD-10-CM

## 2020-02-25 LAB
AMPHETAMINES UR QL SCN: NEGATIVE
BARBITURATES UR QL: NEGATIVE
BENZODIAZ UR QL: NEGATIVE
CANNABINOIDS UR QL SCN: NEGATIVE
COCAINE UR QL: NEGATIVE
ETHANOL UR QL SCN: NEGATIVE
HCG UR QL: NEGATIVE
OPIATES UR QL SCN: NEGATIVE

## 2020-02-25 PROCEDURE — 93005 ELECTROCARDIOGRAM TRACING: CPT | Performed by: EMERGENCY MEDICINE

## 2020-02-25 PROCEDURE — 99284 EMERGENCY DEPT VISIT MOD MDM: CPT | Mod: 25 | Performed by: EMERGENCY MEDICINE

## 2020-02-25 PROCEDURE — 80320 DRUG SCREEN QUANTALCOHOLS: CPT | Performed by: EMERGENCY MEDICINE

## 2020-02-25 PROCEDURE — 25000132 ZZH RX MED GY IP 250 OP 250 PS 637: Performed by: EMERGENCY MEDICINE

## 2020-02-25 PROCEDURE — 93010 ELECTROCARDIOGRAM REPORT: CPT | Mod: Z6 | Performed by: EMERGENCY MEDICINE

## 2020-02-25 PROCEDURE — 81025 URINE PREGNANCY TEST: CPT | Performed by: FAMILY MEDICINE

## 2020-02-25 PROCEDURE — 25000128 H RX IP 250 OP 636: Performed by: EMERGENCY MEDICINE

## 2020-02-25 PROCEDURE — 96372 THER/PROPH/DIAG INJ SC/IM: CPT | Performed by: EMERGENCY MEDICINE

## 2020-02-25 PROCEDURE — 99284 EMERGENCY DEPT VISIT MOD MDM: CPT | Performed by: EMERGENCY MEDICINE

## 2020-02-25 PROCEDURE — 80307 DRUG TEST PRSMV CHEM ANLYZR: CPT | Performed by: EMERGENCY MEDICINE

## 2020-02-25 RX ORDER — OLANZAPINE 10 MG/2ML
10 INJECTION, POWDER, FOR SOLUTION INTRAMUSCULAR ONCE
Status: COMPLETED | OUTPATIENT
Start: 2020-02-25 | End: 2020-02-25

## 2020-02-25 RX ORDER — LORAZEPAM 0.5 MG/1
0.5 TABLET ORAL ONCE
Status: COMPLETED | OUTPATIENT
Start: 2020-02-25 | End: 2020-02-25

## 2020-02-25 RX ADMIN — LORAZEPAM 0.5 MG: 0.5 TABLET ORAL at 22:46

## 2020-02-25 RX ADMIN — OLANZAPINE 10 MG: 10 INJECTION, POWDER, LYOPHILIZED, FOR SOLUTION INTRAMUSCULAR at 21:07

## 2020-02-25 ASSESSMENT — ENCOUNTER SYMPTOMS
NERVOUS/ANXIOUS: 1
NAUSEA: 1
TREMORS: 1
VOMITING: 0
HALLUCINATIONS: 1
DIAPHORESIS: 0

## 2020-02-25 NOTE — ED AVS SNAPSHOT
[facility], Emergency Department  [address]  [facility] [place] [address]  Phone:  [phone]  Fax:  [phone]                                    Gabriela Chaudhry   MRN: [id number]    Department:  [facility], Emergency Department   Date of Visit:  2/25/2020           After Visit Summary Signature Page    I have received my discharge instructions, and my questions have been answered. I have discussed any challenges I see with this plan with the nurse or doctor.    ..........................................................................................................................................  Patient/Patient Representative Signature      ..........................................................................................................................................  Patient Representative Print Name and Relationship to Patient    ..................................................               ................................................  Date                                   Time    ..........................................................................................................................................  Reviewed by Signature/Title    ...................................................              ..............................................  Date                                               Time          22EPIC Rev 08/18

## 2020-02-26 LAB — INTERPRETATION ECG - MUSE: NORMAL

## 2020-02-26 NOTE — DISCHARGE INSTRUCTIONS
Thank you for coming to the [facility] Emergency Department.     Rest tonight. We expect you will feel more like your usual self tomorrow. OK to eat, drink and return to activities as tolerated.     Avoid marijuana and other drugs.

## 2020-02-26 NOTE — ED NOTES
"Dr. Cummings informed of patient symptoms. Patient requesting \"just give me something to make it stop.\"   "

## 2020-02-26 NOTE — ED PROVIDER NOTES
"    [facility] [place] EMERGENCY DEPARTMENT ([facility])    2/25/20        History     Chief Complaint   Patient presents with     Drug Overdose     Patient reports smoking marijuanna with  and \"it was a bad strain\" patient shaking, anxious \"I'm scared please make it stop.\"      Hallucinations     Patient unable to desrcibe exactly what she is hearing or seeing      The history is provided by the patient, the spouse and medical records.     Gabriela Chaudhry is an otherwise healthy 34 year old female who presents to the Emergency Department with drug overdose and hallucinations. Patient reports that she and her  smoked marijuana around 8 or 9 pm and almost immediately after she began feeling nauseous, tremulous, anxious, and frightened. Patient reports now that she is no longer nauseous, but is still very tremulous, and extremely anxious and frightened. Patient was able to stop the tremors for the EKG. When asked about auditory and visual hallucinations she was unable to describe exactly what she was seeing or hearing. Patient denies fever, shortness of breath, vomiting, chest pain, rash, and diaphoresis. She reports she is unsure about SI, but no HI. Patient has had fecal and urinary incontinence upon arrival. It should be noted that her  smoked from the same marijuana and is having no reaction.     I have reviewed the Medications, Allergies, Past Medical and Surgical History, and Social History in the Let system.  Past Medical History:   Diagnosis Date     Other and unspecified ovarian cyst 7/5/11    Ovarian cyst, left, ER at [facility], likely rupture       Past Surgical History:   Procedure Laterality Date     NO HISTORY OF SURGERY         Family History   Problem Relation Age of Onset     Alzheimer Disease Maternal Grandfather      Genitourinary Problems Paternal Grandmother         Kidney failure       Social History     Tobacco Use     Smoking status: Never Smoker     Smokeless tobacco: Never " Used   Substance Use Topics     Alcohol use: No     Alcohol/week: 0.0 standard drinks       No current facility-administered medications for this encounter.      Current Outpatient Medications   Medication     albuterol (PROAIR HFA/PROVENTIL HFA/VENTOLIN HFA) 108 (90 Base) MCG/ACT inhaler     ibuprofen (ADVIL/MOTRIN) 600 MG tablet     ISOtretinoin (CLARAVIS PO)     levonorgestrel-ethinyl estradiol (NORDETTE) 0.15-30 MG-MCG per tablet     spironolactone (ALDACTONE) 25 MG tablet        Allergies   Allergen Reactions     Nkda [No Known Drug Allergies]        Review of Systems   Constitutional: Negative for diaphoresis.   Gastrointestinal: Positive for nausea. Negative for vomiting.   Skin: Negative for rash.   Neurological: Positive for tremors.   Psychiatric/Behavioral: Positive for hallucinations (auditory and visual). The patient is nervous/anxious.    All other systems reviewed and are negative.      Physical Exam   BP: (!) 145/61  Pulse: 121  Temp: 98.1  F (36.7  C)  Resp: 20  SpO2: 100 %      Physical Exam   Gen: agitated and tremulous   HEENT:PERRL, no facial tenderness or wounds, head atraumatic  CV:RRR without murmurs  PULM:Clear to auscultation bilaterally  Abd:soft, nontender, nondistended. Bowel sounds present and normal  UE:No traumatic injuries, skin normal  LE:no traumatic injuries, skin normal, no LE edema.   Neuro:CN II-XII intact, strength 5/5 throughout, gait stable, extensive tremor  Skin: no rashes or ecchymoses      ED Course        Procedures             EKG Interpretation:      Interpreted by Babita Cummings MD  Time reviewed: 9:09pm  Symptoms at time of EKG: drug ingestion  Rhythm: normal sinus   Rate: 97  Axis: normal  Ectopy: none  Conduction: normal  ST Segments/ T Waves: No ST-T wave changes  Q Waves: none  Comparison to prior: Unchanged from Oct. 14, 2018 other than tremor artifact    Clinical Impression: normal EKG                      Labs Ordered and Resulted from Time of ED Arrival  Up to the Time of Departure from the ED   DRUG ABUSE SCREEN 6 CHEM DEP URINE ([facility])   HCG QUALITATIVE URINE            Assessments & Plan (with Medical Decision Making)     33 yo F presenting with agitation and tremulousness after smoking marijuana via bong. New supplier and different method of use than usual. Arrives tachycardic, afebrile.   Physical exam without anticholinergic findings. 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   Improved after treatment with IM zyprexa 10mg and ativan 0.5mg PO.   Urine drug screen negative for all screened drugs, including marijuana, suggestive of possible synthetic product or other intoxication.   Pt was monitored and continued to improve.   Tolerated PO intake and ambulation.  Discharged home.     I have reviewed the nursing notes.    I have reviewed the findings, diagnosis, plan and need for follow up with the patient.    Discharge Medication List as of 2/26/2020 12:12 AM          Final diagnoses:   Adverse effect of drug, initial encounter   IFidelina, am serving as a trained medical scribe to document services personally performed by Babita Cummings MD, based on the provider's statements to me.     IBabita MD, was physically present and have reviewed and verified the accuracy of this note documented by Fidelina Howell.      2/25/2020   [facility], [place], EMERGENCY DEPARTMENT    MD STARR Garrido Katrina Anne, MD  02/27/20 1002

## 2020-09-04 ENCOUNTER — NURSE TRIAGE (OUTPATIENT)
Dept: NURSING | Facility: CLINIC | Age: 35
End: 2020-09-04

## 2020-09-04 ENCOUNTER — VIRTUAL VISIT (OUTPATIENT)
Dept: URGENT CARE | Facility: CLINIC | Age: 35
End: 2020-09-04
Payer: COMMERCIAL

## 2020-09-04 DIAGNOSIS — R11.0 NAUSEA: Primary | ICD-10-CM

## 2020-09-04 PROCEDURE — 99214 OFFICE O/P EST MOD 30 MIN: CPT | Mod: 95 | Performed by: INTERNAL MEDICINE

## 2020-09-04 NOTE — TELEPHONE ENCOUNTER
S: Calling about being nauseated.  B: C/O nausea for 21/2 -3 weeks, some loose stools intermittently, and breast tenderness.  Was off the pill for 2 1/2 months went back on the pill in July.  Her last period was from July 12-18 th.  Current medications are a multi vitamin and levonorgestrel-ethinyl estradiol.     A: Transfer to the scheduling department.  R: Made an appointment for 6:45 on 9/4.    Ana Laura Paul RN, [place] [facility]    Additional Information    Negative: Shock suspected (e.g., cold/pale/clammy skin, too weak to stand, low BP, rapid pulse)    Negative: Sounds like a life-threatening emergency to the triager    Negative: Unable to walk, or can only walk with assistance (e.g., requires support)    Negative: Difficulty breathing    Negative: [1] Insulin-dependent diabetes (Type I) AND [2] glucose > 400 mg/dl (22 mmol/l)    Negative: [1] Drinking very little AND [2] dehydration suspected (e.g., no urine > 12 hours, very dry mouth, very lightheaded)    Negative: Patient sounds very sick or weak to the triager    Negative: Fever > 104 F (40 C)    Negative: [1] Fever > 101 F (38.3 C) AND [2] age > 60    Negative: [1] Fever > 100.0 F (37.8 C) AND [2] bedridden (e.g., nursing home patient, CVA, chronic illness, recovering from surgery)    Negative: [1] Fever > 100.0 F (37.8 C) AND [2] diabetes mellitus or weak immune system (e.g., HIV positive, cancer chemo, splenectomy, organ transplant, chronic steroids)    Negative: Taking any of the following medications: digoxin (Lanoxin), lithium, theophylline, phenytoin (Dilantin)    Negative: Yellowish color of the skin or white of the eye (i.e., jaundice)    Negative: Fever present > 3 days (72 hours)    Negative: Receiving cancer chemotherapy medication    Negative: Taking prescription medication that could cause nausea (e.g., narcotics/opiates, antibiotics, OCPs, many others)    Nausea lasts > 1 week    Protocols used: NAUSEA-A-    COVID 19 Nurse Triage  Plan/Patient Instructions    Please be aware that novel coronavirus (COVID-19) may be circulating in the community. If you develop symptoms such as fever, cough, or SOB or if you have concerns about the presence of another infection including coronavirus (COVID-19), please contact your health care provider or visit [email or web address].     Disposition/Instructions    Virtual Visit with provider recommended. Reference Visit Selection Guide.    Thank you for taking steps to prevent the spread of this virus.  o Limit your contact with others.  o Wear a simple mask to cover your cough.  o Wash your hands well and often.    Resources    [facility]: About COVID-19: [email or web address]    CDC: What to Do If You're Sick: www.cdc.gov/coronavirus/2019-ncov/about/steps-when-sick.html    CDC: Ending Home Isolation: www.cdc.gov/coronavirus/2019-ncov/hcp/disposition-in-home-patients.html     CDC: Caring for Someone: www.cdc.gov/coronavirus/2019-ncov/if-you-are-sick/care-for-someone.html     [facility]: Interim Guidance for Hospital Discharge to Home: www.[facility].[place]/diseases/coronavirus/hcp/hospdischarge.pdf    [facility] clinical trials (COVID-19 research studies): [email or web address]-clinical-trials     Below are the COVID-19 hotlines at the [place] Department of Health ([facility]). Interpreters are available.   o For health questions: Call 827-111-1118 or 1-392.275.1899 (7 a.m. to 7 p.m.)  o For questions about schools and childcare: Call 018-897-3151 or 1-172.743.1972 (7 a.m. to 7 p.m.)

## 2020-09-04 NOTE — PROGRESS NOTES
"  Gabriela Chaudhry is a 35 year old female who is being evaluated via a billable telephone visit.      The patient has been notified of following:     \"This telephone visit will be conducted via a call between you and your physician/provider. We have found that certain health care needs can be provided without the need for a physical exam.  This service lets us provide the care you need with a short phone conversation.  If a prescription is necessary we can send it directly to your pharmacy.  If lab work is needed we can place an order for that and you can then stop by our lab to have the test done at a later time.    If during the course of the call the physician/provider feels a telephone visit is not appropriate, you will not be charged for this service.\"     Patient has given verbal consent for Telephone visit?  Yes    SUBJECTIVE:  Gabriela Chaudhry is an 35 year old female who presents for nausea.  For past 2.5 weeks has had intermittent nausea and loose stools.  Has thought it might be due to stress.  Diarrhea has improved.  Has some lightheadedness in the afternoons sometimes, but then will resolve.  Has been more tired than usual.  Works from home.  Sucking on a lemon tends to help her nausea.  Is on ocp.  Doesn't think she's pregnant as has been regular with her ocp and LMP was August 12 and lasted 5 days but was lighter than usual.  Was on ocp for a while, and then was off it for two months and restarted in July; same med as before.  Sometimes feels a little bloated and constipation.  Has had constipation back in May in the past and had miralax which helped.  No pain with urination or urine changes.  No h/o kidney problems.  No heartburn or acidic taste in mouth.  Some increased burping.  No fevers, chills, sweats.  No abdominal pain.  Slight cramping like period is going to come soon. She also wonders if her multivitamin could be contributing to her nausea, as did change brands recently.      PMH:   has " a past medical history of Other and unspecified ovarian cyst (7/5/11).  Patient Active Problem List   Diagnosis     Reflex abnormality     Acne     Contraception management     CARDIOVASCULAR SCREENING; LDL GOAL LESS THAN 160     Dysmenorrhea     Abdominal pain, left lower quadrant     Social History     Socioeconomic History     Marital status: Single     Spouse name: Not on file     Number of children: Not on file     Years of education: Not on file     Highest education level: Not on file   Occupational History     Employer: [employer]     Occupation: Banker     Employer: [employer]   Social Needs     Financial resource strain: Not on file     Food insecurity     Worry: Not on file     Inability: Not on file     Transportation needs     Medical: Not on file     Non-medical: Not on file   Tobacco Use     Smoking status: Never Smoker     Smokeless tobacco: Never Used   Substance and Sexual Activity     Alcohol use: No     Alcohol/week: 0.0 standard drinks     Drug use: No     Sexual activity: Never     Birth control/protection: Pill     Comment: have not been since july 2011   Lifestyle     Physical activity     Days per week: Not on file     Minutes per session: Not on file     Stress: Not on file   Relationships     Social connections     Talks on phone: Not on file     Gets together: Not on file     Attends [employer]ism service: Not on file     Active member of club or organization: Not on file     Attends meetings of clubs or organizations: Not on file     Relationship status: Not on file     Intimate partner violence     Fear of current or ex partner: Not on file     Emotionally abused: Not on file     Physically abused: Not on file     Forced sexual activity: Not on file   Other Topics Concern     Parent/sibling w/ CABG, MI or angioplasty before 65F 55M? No   Social History Narrative    Caffeine intake/servings daily - 0    Calcium intake/servings daily - 1    Exercise 1-2 times weekly - describe  cardio    Sunscreen used  - No    Seatbelts used - Yes    Guns stored in the home - No    Self Breast Exam - No    Pap test up to date -  No    Eye exam up to date -  No    Dental exam up to date -  No    DEXA scan up to date -  Not Applicable    Flex Sig/Colonoscopy up to date -  Not Applicable    Mammography up to date -  Not Applicable    Immunizations reviewed and up to date - Yes 2006    Abuse: Current or Past (Physical, Sexual or Emotional) - No    Do you feel safe in your environment - Yes    Do you cope well with stress - Yes    Do you suffer from insomnia - No    Last updated by: Joanne Gilligan  7/12/2011                 Family History   Problem Relation Age of Onset     Alzheimer Disease Maternal Grandfather      Genitourinary Problems Paternal Grandmother         Kidney failure       ALLERGIES:  Nkda [no known drug allergies]    Current Outpatient Medications   Medication     albuterol (PROAIR HFA/PROVENTIL HFA/VENTOLIN HFA) 108 (90 Base) MCG/ACT inhaler     ibuprofen (ADVIL/MOTRIN) 600 MG tablet     ISOtretinoin (CLARAVIS PO)     levonorgestrel-ethinyl estradiol (NORDETTE) 0.15-30 MG-MCG per tablet     spironolactone (ALDACTONE) 25 MG tablet     No current facility-administered medications for this visit.          ROS:  ROS is done and is negative for general/constitutional, eye, ENT, Respiratory, cardiovascular, GI, , Skin, musculoskeletal except as noted elsewhere.  All other review of systems negative except as noted elsewhere.      OBJECTIVE:  There were no vitals taken for this visit.  GENERAL : Alert and oriented.  Did not seem to be in distress.   RESP: No respiratory distress detected during phone conversation         ASSESSMENT/PLAN:    ASSESSMENT / PLAN:  (R11.0) Nausea  (primary encounter diagnosis)  Comment: many possible etiologies for her nausea including medication side effect, pregnancy, or constipation. .  Plan: as pt was off her ocp for a time a 2-3 months ago, will have her check pregnancy test either by  going to her doctor or an uc, or doing a home test.  Also she has had some issues with constipation in the past few months and her sxs may be due to recurrence of constipation, so she is to take miralax daily for 3+ days.  If pregnancy test is negative and there is no improvement in her sxs with miralax, she is to f/u in person with pcp or uc.  Also consider gerd, anxiety, obstruction, neurologic causes, etc if her sxs do not improve or if worsen, so will need to be seen if not improve.  Reviewed medication instructions and side effects. Follow up if experiences side effects.. I reviewed supportive care, otc meds to use if needed, expected course, and signs of concern.  Follow up as needed or if she does not improve within 5 day(s) or if worsens in any way.  Reviewed red flag symptoms and is to go to the ER if experiences any of these.  Pt's many questions answered.          See [facility] for orders, medications, letters, patient instructions    Jesusita Caro MD  9/4/2020, 6:41 PM      Phone call duration:  25 minutes

## 2021-04-06 ENCOUNTER — OFFICE VISIT (OUTPATIENT)
Dept: OBGYN | Facility: CLINIC | Age: 36
End: 2021-04-06
Attending: MIDWIFE
Payer: COMMERCIAL

## 2021-04-06 VITALS
SYSTOLIC BLOOD PRESSURE: 103 MMHG | HEIGHT: 64 IN | DIASTOLIC BLOOD PRESSURE: 70 MMHG | BODY MASS INDEX: 18.44 KG/M2 | HEART RATE: 69 BPM | WEIGHT: 108 LBS

## 2021-04-06 DIAGNOSIS — Z30.430 ENCOUNTER FOR IUD INSERTION: ICD-10-CM

## 2021-04-06 DIAGNOSIS — Z12.4 SCREENING FOR MALIGNANT NEOPLASM OF CERVIX: ICD-10-CM

## 2021-04-06 DIAGNOSIS — Z11.3 SCREENING EXAMINATION FOR VENEREAL DISEASE: ICD-10-CM

## 2021-04-06 DIAGNOSIS — Z01.419 ENCOUNTER FOR GYNECOLOGICAL EXAMINATION WITHOUT ABNORMAL FINDING: ICD-10-CM

## 2021-04-06 DIAGNOSIS — Z97.5 IUD (INTRAUTERINE DEVICE) IN PLACE: ICD-10-CM

## 2021-04-06 DIAGNOSIS — Z00.00 VISIT FOR PREVENTIVE HEALTH EXAMINATION: Primary | ICD-10-CM

## 2021-04-06 LAB
HCG UR QL: NEGATIVE
INTERNAL QC OK POCT: YES

## 2021-04-06 PROCEDURE — 81025 URINE PREGNANCY TEST: CPT | Performed by: MIDWIFE

## 2021-04-06 PROCEDURE — 87491 CHLMYD TRACH DNA AMP PROBE: CPT | Performed by: MIDWIFE

## 2021-04-06 PROCEDURE — 58300 INSERT INTRAUTERINE DEVICE: CPT | Performed by: MIDWIFE

## 2021-04-06 PROCEDURE — 250N000009 HC RX 250: Performed by: MIDWIFE

## 2021-04-06 PROCEDURE — 99385 PREV VISIT NEW AGE 18-39: CPT | Mod: 25 | Performed by: MIDWIFE

## 2021-04-06 PROCEDURE — 87591 N.GONORRHOEAE DNA AMP PROB: CPT | Performed by: MIDWIFE

## 2021-04-06 RX ORDER — COPPER 313.4 MG/1
1 INTRAUTERINE DEVICE INTRAUTERINE ONCE
Status: COMPLETED
Start: 2021-04-06 | End: 2021-04-06

## 2021-04-06 RX ORDER — COPPER 313.4 MG/1
1 INTRAUTERINE DEVICE INTRAUTERINE ONCE
COMMUNITY
End: 2023-05-02

## 2021-04-06 RX ADMIN — COPPER 1 EACH: 313.4 INTRAUTERINE DEVICE INTRAUTERINE at 17:52

## 2021-04-06 ASSESSMENT — ANXIETY QUESTIONNAIRES
5. BEING SO RESTLESS THAT IT IS HARD TO SIT STILL: SEVERAL DAYS
2. NOT BEING ABLE TO STOP OR CONTROL WORRYING: SEVERAL DAYS
1. FEELING NERVOUS, ANXIOUS, OR ON EDGE: SEVERAL DAYS
GAD7 TOTAL SCORE: 7
6. BECOMING EASILY ANNOYED OR IRRITABLE: SEVERAL DAYS
7. FEELING AFRAID AS IF SOMETHING AWFUL MIGHT HAPPEN: NOT AT ALL
3. WORRYING TOO MUCH ABOUT DIFFERENT THINGS: SEVERAL DAYS

## 2021-04-06 ASSESSMENT — PATIENT HEALTH QUESTIONNAIRE - PHQ9
5. POOR APPETITE OR OVEREATING: MORE THAN HALF THE DAYS
SUM OF ALL RESPONSES TO PHQ QUESTIONS 1-9: 0

## 2021-04-06 ASSESSMENT — MIFFLIN-ST. JEOR: SCORE: 1174.88

## 2021-04-06 NOTE — PATIENT INSTRUCTIONS
PREVENTIVE HEALTH RECOMMENDATIONS:   Most women need a yearly breast and pelvic exam.    A PAP screen, a test done DURING a pelvic exam, is NO longer recommended yearly.    March 2013, screening guidelines recommended by ACOG for PAP screen are:    1) First pap at age 21.    2) Pap every 3 years until age 30.    3) After age 30, pap every 3 years or Pap with HR HPV screen every 5 years until age 65.  4) Women do NOT need a vaginal Pap screen after a hysterectomy (surgical removal of the uterus) when they have not had cancer.    Exceptions:  1) Yearly pap if HIV+ or immunosuppressed secondary to organ transplant  2) CHRISTIANO II-III continue routine screening for 20 years.    I encourage you continue looking for opportunities to choose a healthy lifestyle:       * Choose to eat a heart healthy diet. Check out the FOOD PLATE guidelines at: http://www.choosemyplate.gov/ for helpful hints on weight and cholesterol management.  Balance your caloric intake with exercise to maintain a BMI in the 22 to 26 range. For bone health: Eat calcium-rich foods like yogurt, broccoli or take chewable calcium pills (500 to 600 mg) twice a day with food.       * Exercise for at least an average of 30 minutes a day, 5 days of the week. This will help you control your weight, release stress, and help prevent disease.      * Take a Vitamin D3 supplement daily fall through spring and during summer unless you [id number]' full body sun exposure to skin without sunscreen.      * DO wear sunscreen to prevent skin cancer after the first 15-30 minutes.      * Identify stressors in your life, find ways to release the stress, and, make time for yourself. PLEASE ask for help if mood changes last longer than two weeks.     * Limit alcohol to one drink per day.  No smoking.  Avoid second hand smoke. If you smoke, ask for help to stop.       *  If you are in a sexual relationship, talk with your partner about possible infection risks and take action to  protect yourself from exposure to a sexual infection.    Please request an infection screen for STIs (sexually transmitted infections) if you are less than age 26 OR believe that you may be at risk.     Get a flu shot each year. Get a tetanus shot every 10 years. EVERYONE needs a pertussis (Whooping cough) booster.    See your dentist twice a year for an exam and preventive care cleaning.     Consider the following screen tests:    1) cholesterol test every 5 years.     2) yearly mammogram after age 40 unless you have identified risks.    3) colonoscopy every 10 years after age 50 unless you have identified risks.    4) diabetes blood test screening if you are at risk for diabetes.      Additional information that you may also find helpful:  The Internet now gives us access to LOTS of information -- some of it helpful, research documented and also plenty of harmful, anecdotal information that may not pertain to your situtaion. Consider visiting the following websites for accurate health information:    www.vitamindcouncil.org/ : Info and ongoing research re Vitamin D    www.[facility]view.org : Up to date and easily searchable information on multiple topics.    www.medlineplus.gov : medication info, interactive tutorials, watch real surgeries online    www.cdc.gov : public health info, travel advisories, epidemics (H1N1)    www.jaime/std.org: current research re diagnosis, treatment and prevention of sexually contacted infections.    www.health[facility] : [place] dept of heatl, public health issues in [place], N1N1    www.familydoctor.org : good info from the Academy of Family Physicians        IUD information:     Benefits: The IUD can be 97-99% effective when carefully following directions regarding use. It can be more effective if used with additional contraception. IUD containing progestin may decrease menstrual flow and menstrual cramping.     Risks/Side Effects: include but are not limited to spotting, bleeding,  hemorrhage, or anemia: cramping or pain: partial or complete expulsion of device; lost IUD strings; uterine or cervical perforation; embedding of IUD in the uterine wall; increased risk of pelvic inflammatory disease. Women who become pregnant with an IUD in place are at a higher risk for ectopic pregnancy should a pregnancy occur with an IUD in situ. There is a higher rate of miscarriage when pregnancy occurs with IUD in place.    Warning signs: Please call clinic if you have abnormal spotting or heavy bleeding, abdominal pain, dyspareunia, fever, chills, flu like symptoms, or unable to locate strings of IUD, or strings are longer or shorter than expected.    You are encouraged to use condoms for prevention of STD. You may also need back up contraception for 7 days with your IUD. You may use pain medications (ibuprofen) as needed for mild to moderate pain. Please follow-up in clinic in 4-6 weeks for IUD string check if unable to find strings or as directed by provider.
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
## 2021-04-07 ASSESSMENT — ANXIETY QUESTIONNAIRES: GAD7 TOTAL SCORE: 7

## 2021-04-08 ENCOUNTER — TELEPHONE (OUTPATIENT)
Dept: OBGYN | Facility: CLINIC | Age: 36
End: 2021-04-08

## 2021-04-08 LAB
C TRACH DNA SPEC QL NAA+PROBE: NEGATIVE
N GONORRHOEA DNA SPEC QL NAA+PROBE: NEGATIVE
SPECIMEN SOURCE: NORMAL
SPECIMEN SOURCE: NORMAL

## 2021-04-08 NOTE — TELEPHONE ENCOUNTER
Message received from patient regarding spotting and cramping after IUD placement.    Called and spoke with patient, states she had IUD placement Tuesday and has had mild spotting and bleeding since. Explained that is a normal finding and should get better over the next couple of day.    Reviewed bleeding and pain precautions and when to call provider/seek emergency care and she verbalized understanding and denied further questions/concerns.

## 2021-05-04 ENCOUNTER — TELEPHONE (OUTPATIENT)
Dept: OBGYN | Facility: CLINIC | Age: 36
End: 2021-05-04

## 2021-05-04 ENCOUNTER — NURSE TRIAGE (OUTPATIENT)
Dept: NURSING | Facility: CLINIC | Age: 36
End: 2021-05-04

## 2021-05-04 NOTE — TELEPHONE ENCOUNTER
Centralized Triage Team OB concern note:  Who is your provider and where are you seen?  [facility], Zhane Taylor  What phone number do you have for contacting your OB/GYN team? [phone]  She left message and per chart, Ivory @ [facility] had tried her back x 3.  Confirmed #  [phone]       IUD placed 4/6/2021 paragard intrauterine copper IUD devic for contraception purposes  Symptoms/ Concern:  -Wondering about CONTINUED CRAMPING AFTER PERIOD , flow finished finished 3-1/2 days ago.This is her first cycle post-placement.  -She continues with mild/ moderate cramping without spotting now- used 600 mg motrin morning and evening to control. The cramps awoke her in the night  -Post placement she did have   spotting only and lasted 7 days  after placement     Baseline: BCP> less cramping and barely bleeding stopped 3-4 months.  Prior to BCP and IUD: Cramps and heavier bleeding were her norm    Sexually active now: not painful.   -She does not appreciate strings or have any vaginal pain/ discomfort balso has not tried feeling for her cervix, as she is a bit uncertain that she should.       Heating pad left a rash on tummy which are fading  Fever, chills negative    Attempted transfer to [facility], left detailed message in RN line.       Additional Information    Negative: Shock suspected (e.g., cold/pale/clammy skin, too weak to stand, low BP, rapid pulse)    Negative: Sounds like a life-threatening emergency to the triager    Negative: SEVERE abdominal pain (e.g., excruciating) and present > 1 hour    Negative: SEVERE vaginal bleeding (i.e., soaking 2 pads or tampons per hour and present 2 or more hours)    Negative: MODERATE vaginal bleeding (i.e., soaking 1 pad or tampon per hour and present > 6 hours)    Negative: Constant abdominal pain and present > 2 hours    MILD abdominal pain (e.g., does not interfere with normal activities) that comes and goes (cramps) and present > 48 hours    Protocols used:  CONTRACEPTION - IUD SYMPTOMS AND [id number]

## 2021-05-04 NOTE — TELEPHONE ENCOUNTER
Pt left a message stating that she has questions about IUD.  This writer tried to return the call but the call would not go through.  Tried 3 times.

## 2021-05-04 NOTE — TELEPHONE ENCOUNTER
Gabriela is having cramping even though menses ended 3 days ago.  No vaginal discomfort.  She has not felt for strings.    Discussed that she can check for strings for reassurance that IUD is correctly placed.  If pain continues or if she is unable to feel strings, to call back to schedule appt for office visit or US to assess.

## 2021-05-04 NOTE — TELEPHONE ENCOUNTER
Attempted to reach patient again. Unable to connect. Email sent to patient requesting she reach out to [phone] and to ask to be transferred to RN triage.

## 2021-05-06 ENCOUNTER — NURSE TRIAGE (OUTPATIENT)
Dept: NURSING | Facility: CLINIC | Age: 36
End: 2021-05-06

## 2021-05-07 NOTE — TELEPHONE ENCOUNTER
"Pt is calling.    Copper IUD placed April 6th, 2021. Before that she was on OCP\"s and menses were mild.  Menses was heavier flow with more cramping than usual.  Menses ended 4 days ago and still having some mild cramping. I advised her that this is all very common in the first 3 months of getting an IUD. When to call back reviewed.   Reassurance given. Care advice reviewed. She verbalized understanding.    Additional Information    Negative: Shock suspected (e.g., cold/pale/clammy skin, too weak to stand, low BP, rapid pulse)    Negative: Sounds like a life-threatening emergency to the triager    Negative: [1] Abdominal pain AND [2] pregnant < 20 weeks    Negative: [1] Vaginal bleeding AND [2] pregnant < 20 weeks    Negative: Vaginal discharge is main symptom    Negative: [1] SEVERE abdominal pain (e.g., excruciating) AND [2] present > 1 hour    Negative: SEVERE vaginal bleeding (i.e., soaking 2 pads or tampons per hour and present 2 or more hours)    Negative: Patient sounds very sick or weak to the triager    Negative: MODERATE vaginal bleeding (i.e., soaking 1 pad or tampon per hour and present > 6 hours)    Negative: [1] Constant abdominal pain AND [2] present > 2 hours    Negative: [1] Caller has URGENT question AND [2] triager unable to answer question    Negative: [1] MILD abdominal pain (e.g., does not interfere with normal activities) AND [2] pain comes and goes (cramps) AND [3] present > 48 hours    Negative: [1] Caller has NON-URGENT question AND [2] triager unable to answer question    Negative: Pregnant    Negative: [1] Periods with > 6 soaked pads or tampons per day AND [2] last > 7 days    Negative: Worried that IUD is not in right place (e.g., not able to feel the IUD string, string longer than usual, can feel hard plastic of IUD)    Negative: IUD came out (e.g., has IUD in her hand)    Negative: Bad smelling vaginal discharge    Negative: Abnormal color vaginal discharge (i.e., yellow, green, " gray)    Negative: Periods with > 6 soaked pads or tampons per day    Negative: Periods last > 7 days    Negative: [1] Periods are WORSE (more bleeding or pain) since IUD was placed AND [2] more than 3 months (3 menstrual cycles) since IUD was placed    Negative: [1] Bleeding or spotting between periods since IUD was placed AND [2]  more than 3 months (3 menstrual cycles) since IUD was placed    Negative: [1] Has a 3-year hormonal IUD (e.g., Jaydess, Liletta, Sklya) AND [2] more than 3 years since insertion    Negative: [1] Has 5-year hormonal IUD (e.g., Kyleena, LNG-IUS, Mirena, Jen) AND [2] more than 5 years since insertion    Negative: [1] Has copper IUD (e.g., ParaGard) AND [2] more than 10 years since insertion    Negative: Wants to get IUD removed    [1] Periods are WORSE (more bleeding or pain) since IUD was placed AND [2]  3 or less months (3 menstrual cycles) since IUD was placed    Protocols used: CONTRACEPTION - IUD SYMPTOMS AND [id number]    Sayra Zaldivar RN  [facility] Nurse Advisor  5/6/2021 at 8:16 PM

## 2021-05-20 ENCOUNTER — OFFICE VISIT (OUTPATIENT)
Dept: OBGYN | Facility: CLINIC | Age: 36
End: 2021-05-20
Attending: ADVANCED PRACTICE MIDWIFE
Payer: COMMERCIAL

## 2021-05-20 VITALS
BODY MASS INDEX: 18.4 KG/M2 | SYSTOLIC BLOOD PRESSURE: 107 MMHG | HEART RATE: 68 BPM | HEIGHT: 64 IN | WEIGHT: 107.8 LBS | DIASTOLIC BLOOD PRESSURE: 71 MMHG

## 2021-05-20 DIAGNOSIS — Z30.431 INTRAUTERINE DEVICE SURVEILLANCE: Primary | ICD-10-CM

## 2021-05-20 PROCEDURE — G0463 HOSPITAL OUTPT CLINIC VISIT: HCPCS

## 2021-05-20 PROCEDURE — 99213 OFFICE O/P EST LOW 20 MIN: CPT | Performed by: ADVANCED PRACTICE MIDWIFE

## 2021-05-20 RX ORDER — SERTRALINE HYDROCHLORIDE 25 MG/1
25 TABLET, FILM COATED ORAL DAILY
COMMUNITY

## 2021-05-20 ASSESSMENT — MIFFLIN-ST. JEOR: SCORE: 1173.98

## 2021-05-20 ASSESSMENT — PAIN SCALES - GENERAL: PAINLEVEL: NO PAIN (0)

## 2021-05-20 NOTE — LETTER
"2021       RE: Gabriela Chaudhry  [address] [place] [address]     Dear Colleague,    Thank you for referring your patient, Gabriela Chaudhry, to the [facility]. Please see a copy of my visit note below.    Chief Complaint: IUD check  Subjective: 36 year old  presents for IUD check .   IUD was placed on 2021. A little bleeding / cramping after it was placed, few days. FDLMP:  2021.  Then had more cramping and 3 Ibuprofen in morning and night. Period was 7 days, heavy. Has been on pill, periods were lighter then. Thinks cramping even after period. Feels right now Monday / Tuesday a little cramping, feeling \"tender\" down below. \"Felt like something was going to drop but it didn't.\" Hasn't checked her strings herself. Has had sexual activity since last placement.   No family history of breast, endometrial cancer. Paternal aunt with ovarian cancer.   Uncle had colon cancer age ~late 50s.   Only used pills in past contraceptive. Felt pills making her hormones go \"a little crazy.\" Wanted hormone free option.   O:   Vitals:    21 1549   BP: 107/71   Pulse: 68   Weight: 48.9 kg (107 lb 12.8 oz)   Height: 1.626 m (5' 4\")     -VS: reviewed and within normal limits   -General appearance: no acute distress, patient is comfortable   NEUROLOGICAL/PSYCHIATRIC   - Orientated x3,   -Mood and affect: : normal   PELVIC: Normal external female genitalia without lesions. Adequate perineal body, normal urethral meatus. Vagina moist and well rugated without lesions or discharge. Cervix pink, without lesions, discharge or tenderness.   Strings felt. Bimanual exam shows uterus is regular, mobile and nontender.   Assessment/Plan   Gabriela is a 37 yo G0 who was seen today for follow up.  Diagnoses and all orders for this visit:    Intrauterine device surveillance  - IUD check, in place  Pt is concerned about ovarian " cancer with increased cramping, counseled and answered questions about her concerns. Most likely her cramping is related to recent copper IUD placement and heavier periods after being used to OCPs. [place] decision making with patient today to get transvaginal US to visualize IUD and other surrounding structures. Discussed continuing with copper IUD and seeing how her periods settle out over the next few months, but she should return if still having concerns with her new birth control method. Discussed NSAIDs for heavy menstrual bleeding and cramping.    - Reviewed how/why to contact provider including increased abdominal cramping, vaginal bleeding, abdominal pain, fever or flu like symptoms or if unable to locate strings on monthly check.   - RTO prn if questions or concerns         -     US Transvaginal Non OB; Future        I, Ruth Alanis, , am serving as a scribe; to document services personally performed by  Albina Tapia CNM based on data collection and the provider's statements to me.     Ruth Alanis DO    I agree with the PFSH and ROS as completed by Dr. Alanis except for changes made by me. The remainder of the encounter was performed by me and scribed by Dr. Alanis. The scribed note accurately reflects my personal services and decisions made by me.   JOEY Loco CNM

## 2021-05-20 NOTE — PROGRESS NOTES
"Chief Complaint: IUD check  Subjective: 36 year old  presents for IUD check .   IUD was placed on 2021. A little bleeding / cramping after it was placed, few days. FDLMP:  2021.  Then had more cramping and 3 Ibuprofen in morning and night. Period was 7 days, heavy. Has been on pill, periods were lighter then. Thinks cramping even after period. Feels right now Monday / Tuesday a little cramping, feeling \"tender\" down below. \"Felt like something was going to drop but it didn't.\" Hasn't checked her strings herself. Has had sexual activity since last placement.   No family history of breast, endometrial cancer. Paternal aunt with ovarian cancer.   Uncle had colon cancer age ~late 50s.   Only used pills in past contraceptive. Felt pills making her hormones go \"a little crazy.\" Wanted hormone free option.   O:   Vitals:    21 1549   BP: 107/71   Pulse: 68   Weight: 48.9 kg (107 lb 12.8 oz)   Height: 1.626 m (5' 4\")     -VS: reviewed and within normal limits   -General appearance: no acute distress, patient is comfortable   NEUROLOGICAL/PSYCHIATRIC   - Orientated x3,   -Mood and affect: : normal   PELVIC: Normal external female genitalia without lesions. Adequate perineal body, normal urethral meatus. Vagina moist and well rugated without lesions or discharge. Cervix pink, without lesions, discharge or tenderness.   Strings felt. Bimanual exam shows uterus is regular, mobile and nontender.   Assessment/Plan   Gabriela is a 37 yo G0 who was seen today for follow up.  Diagnoses and all orders for this visit:    Intrauterine device surveillance  - IUD check, in place  Pt is concerned about ovarian cancer with increased cramping, counseled and answered questions about her concerns. Most likely her cramping is related to recent copper IUD placement and heavier periods after being used to OCPs. [place] decision making with patient today to get transvaginal US to visualize IUD and other surrounding structures. " Discussed continuing with copper IUD and seeing how her periods settle out over the next few months, but she should return if still having concerns with her new birth control method. Discussed NSAIDs for heavy menstrual bleeding and cramping.    - Reviewed how/why to contact provider including increased abdominal cramping, vaginal bleeding, abdominal pain, fever or flu like symptoms or if unable to locate strings on monthly check.   - RTO prn if questions or concerns         -     US Transvaginal Non OB; Future        I, Ruth Alanis DO, am serving as a scribe; to document services personally performed by  Albina Tapia CNM based on data collection and the provider's statements to me.     Ruth Alanis DO    I agree with the PFSH and ROS as completed by Dr. Alanis except for changes made by me. The remainder of the encounter was performed by me and scribed by Dr. Alanis. The scribed note accurately reflects my personal services and decisions made by me.   JOEY Loco CNM

## 2021-05-21 ENCOUNTER — ANCILLARY PROCEDURE (OUTPATIENT)
Dept: ULTRASOUND IMAGING | Facility: CLINIC | Age: 36
End: 2021-05-21
Attending: ADVANCED PRACTICE MIDWIFE
Payer: COMMERCIAL

## 2021-05-21 DIAGNOSIS — Z30.431 INTRAUTERINE DEVICE SURVEILLANCE: ICD-10-CM

## 2021-05-21 PROCEDURE — 76830 TRANSVAGINAL US NON-OB: CPT | Mod: 26 | Performed by: OBSTETRICS & GYNECOLOGY

## 2021-05-21 PROCEDURE — 76830 TRANSVAGINAL US NON-OB: CPT

## 2021-09-07 ENCOUNTER — OFFICE VISIT (OUTPATIENT)
Dept: OBGYN | Facility: CLINIC | Age: 36
End: 2021-09-07
Payer: COMMERCIAL

## 2021-09-07 VITALS
SYSTOLIC BLOOD PRESSURE: 102 MMHG | WEIGHT: 108 LBS | HEART RATE: 64 BPM | HEIGHT: 64 IN | BODY MASS INDEX: 18.44 KG/M2 | DIASTOLIC BLOOD PRESSURE: 64 MMHG

## 2021-09-07 DIAGNOSIS — N89.8 VAGINAL DISCHARGE: Primary | ICD-10-CM

## 2021-09-07 LAB
NUGENT SCORE: 9
WHITE BLOOD CELLS: ABNORMAL

## 2021-09-07 PROCEDURE — 87205 SMEAR GRAM STAIN: CPT | Performed by: OBSTETRICS & GYNECOLOGY

## 2021-09-07 PROCEDURE — 87106 FUNGI IDENTIFICATION YEAST: CPT | Performed by: OBSTETRICS & GYNECOLOGY

## 2021-09-07 PROCEDURE — 99203 OFFICE O/P NEW LOW 30 MIN: CPT | Performed by: OBSTETRICS & GYNECOLOGY

## 2021-09-07 PROCEDURE — 87102 FUNGUS ISOLATION CULTURE: CPT | Performed by: OBSTETRICS & GYNECOLOGY

## 2021-09-07 PROCEDURE — 87653 STREP B DNA AMP PROBE: CPT | Performed by: OBSTETRICS & GYNECOLOGY

## 2021-09-07 RX ORDER — SPIRONOLACTONE 100 MG/1
TABLET, FILM COATED ORAL
COMMUNITY
Start: 2021-08-23

## 2021-09-07 RX ORDER — TAZAROTENE 1 MG/G
CREAM TOPICAL
COMMUNITY
Start: 2021-07-29

## 2021-09-07 ASSESSMENT — MIFFLIN-ST. JEOR: SCORE: 1174.88

## 2021-09-07 NOTE — Clinical Note
Please abstract the following data from this visit with this patient into the appropriate field in Epic:        Other Tests found in the patient's chart through Chart Review/Care Everywhere:    [facility] and HPV done by this [facility] on this date: 10/18/19    Note to Abstraction: If this section is blank, no results were found via Chart Review/Care Everywhere.

## 2021-09-07 NOTE — PROGRESS NOTES
SUBJECTIVE:                                                   Gabriela Chaudhry is a 36 year old female who presents to clinic today for the following health issue(s):  Patient presents with:  Vaginal Problem: self treated for possible yeast infection about 1 mth ago with a 3 day miconazole, felt like she improved but now has a fishy order with some discharge.      HPI: The patient is seen at this time for return of a heavy white discharge.  She self treated for a yeast infection 6 weeks ago.  She has a ParaGard IUD and is now on day 20 of her cycle with menses due in approximately 9 days.  She has no new sexual contact that she is concerned about.  She did self treat the second time approximately 2 weeks ago.      Patient's last menstrual period was 2021..     Patient is sexually active, .  Using Paragard IUD for contraception.    reports that she has never smoked. She has never used smokeless tobacco.    STD testing offered?  Declined    Health maintenance updated:  yes    Today's PHQ-2 Score:   PHQ-2 (  [employer]) 2021   Q1: Little interest or pleasure in doing things 0   Q2: Feeling down, depressed or hopeless 0   PHQ-2 Score 0     Today's PHQ-9 Score:   PHQ-9 SCORE 2021   PHQ-9 Total Score 0     Today's HEATHER-7 Score:   HEATHER-7 SCORE 2021   Total Score 7       Problem list and histories reviewed & adjusted, as indicated.  Additional history: as documented.    Patient Active Problem List   Diagnosis     Reflex abnormality     Acne     Contraception management     CARDIOVASCULAR SCREENING; LDL GOAL LESS THAN 160     Dysmenorrhea     Abdominal pain, left lower quadrant     IUD (intrauterine device) in place- Paragard 2021     Past Surgical History:   Procedure Laterality Date     NO HISTORY OF SURGERY        Social History     Tobacco Use     Smoking status: Never Smoker     Smokeless tobacco: Never Used   Substance Use Topics     Alcohol use: No     Alcohol/week: 0.0 standard drinks       "Problem (# of Occurrences) Relation (Name,Age of Onset)    Alzheimer Disease (1) Maternal Grandfather    Genitourinary Problems (1) Paternal Grandmother: Kidney failure            Current Outpatient Medications   Medication Sig     paragard intrauterine copper device 1 each by Intrauterine route once     sertraline (ZOLOFT) 25 MG tablet Take 25 mg by mouth daily     spironolactone (ALDACTONE) 100 MG tablet      tazarotene (TAZORAC) 0.1 % external cream      No current facility-administered medications for this visit.     Allergies   Allergen Reactions     Nkda [No Known Drug Allergies]        ROS:  12 point review of systems negative other than symptoms noted below or in the HPI.  Genitourinary: Vaginal Discharge and vaginal odor  No urinary frequency or dysuria, bladder or kidney problems      OBJECTIVE:     /64   Pulse 64   Ht 1.626 m (5' 4\")   Wt 49 kg (108 lb)   LMP 08/17/2021   BMI 18.54 kg/m    Body mass index is 18.54 kg/m .    Exam:  Constitutional:  Appearance: Well nourished, well developed alert, in no acute distress  Lymphatic: Lymph Nodes:  No other lymphadenopathy present  Skin: General Inspection:  No rashes present, no lesions present, no areas of discoloration.  Neurologic:  Mental Status:  Oriented X3.  Normal strength and tone, sensory exam grossly normal, mentation intact and speech normal.    Psychiatric:  Mentation appears normal and affect normal/bright.  Pelvic Exam:  External Genitalia:     Normal appearance for age, no discharge present, no tenderness present, no inflammatory lesions present, color normal  Vagina: Thick white discharge but does not look like yeast     Bladder:     Nontender to palpation  Urethra:   Urethral Body:  Urethra palpation normal, urethra structural support normal   Urethral Meatus:  No erythema or lesions present  Cervix:     Appearance healthy, no lesions present, nontender to palpation, no bleeding present  Uterus:     Uterus: firm, normal sized and " nontender.   Adnexa:     No adnexal tenderness present, no adnexal masses present  Perineum:     Perineum within normal limits, no evidence of trauma, no rashes or skin lesions present  Anus:     Anus within normal limits, no hemorrhoids present  Inguinal Lymph Nodes:     No lymphadenopathy present  Pubic Hair:     Normal pubic hair distribution for age  Genitalia and Groin:     No rashes present, no lesions present, no areas of discoloration, no masses present       In-Clinic Test Results:  No results found for this or any previous visit (from the past 24 hour(s)).    ASSESSMENT/PLAN:                                                        ICD-10-CM    1. Vaginal discharge  N89.8 Fungal or Yeast Culture Routine     Bacterial Vaginosis Smear     Group B strep PCR     Patient with thick white discharge that does not specifically look like yeast.  We will rely on Gram stain and culture and treat specifically.  We have asked her to sitz bath at this time.        Donald Griffith MD  [facility] [place]

## 2021-09-08 ENCOUNTER — TELEPHONE (OUTPATIENT)
Dept: OBGYN | Facility: CLINIC | Age: 36
End: 2021-09-08

## 2021-09-08 LAB
GP B STREP DNA SPEC QL NAA+PROBE: POSITIVE
PATIENT PENICILLIN, AMOXICILLIN, CEPHALOSPORINS ALLERGY: NO

## 2021-09-09 DIAGNOSIS — N76.0 BACTERIAL VAGINOSIS: Primary | ICD-10-CM

## 2021-09-09 DIAGNOSIS — B96.89 BACTERIAL VAGINOSIS: Primary | ICD-10-CM

## 2021-09-09 DIAGNOSIS — B37.31 YEAST VAGINITIS: ICD-10-CM

## 2021-09-09 RX ORDER — FLUCONAZOLE 150 MG/1
150 TABLET ORAL
Qty: 2 TABLET | Refills: 0 | Status: SHIPPED | OUTPATIENT
Start: 2021-09-09 | End: 2022-06-07

## 2021-09-09 RX ORDER — METRONIDAZOLE 500 MG/1
500 TABLET ORAL 2 TIMES DAILY
Qty: 14 TABLET | Refills: 0 | Status: SHIPPED | OUTPATIENT
Start: 2021-09-09 | End: 2021-09-16

## 2021-09-10 ENCOUNTER — NURSE TRIAGE (OUTPATIENT)
Dept: NURSING | Facility: CLINIC | Age: 36
End: 2021-09-10

## 2021-09-10 LAB — BACTERIA SPEC CULT: ABNORMAL

## 2021-09-10 NOTE — TELEPHONE ENCOUNTER
"\"I was seen on 9/7 for a yeast infection and BV. I was started on 2 different medications, see epic. I am also going to get my period in 4 days. Is it common to get some mild cramping?\"  Denies other sx  Gave home care advice  Call back if needed  Alpa Andujar RN [facility]        Reason for Disposition    [1] Follow-up call to recent contact AND [2] information only call, no triage required    Protocols used: INFORMATION ONLY CALL - NO TRIAGE-A-      "

## 2022-02-24 ENCOUNTER — OFFICE VISIT (OUTPATIENT)
Dept: OBGYN | Facility: CLINIC | Age: 37
End: 2022-02-24
Attending: MIDWIFE
Payer: COMMERCIAL

## 2022-02-24 VITALS
HEIGHT: 64 IN | BODY MASS INDEX: 19.75 KG/M2 | WEIGHT: 115.7 LBS | HEART RATE: 71 BPM | DIASTOLIC BLOOD PRESSURE: 69 MMHG | SYSTOLIC BLOOD PRESSURE: 104 MMHG

## 2022-02-24 DIAGNOSIS — R10.32 LLQ ABDOMINAL PAIN: ICD-10-CM

## 2022-02-24 DIAGNOSIS — Z30.431 INTRAUTERINE DEVICE SURVEILLANCE: Primary | ICD-10-CM

## 2022-02-24 PROCEDURE — 99213 OFFICE O/P EST LOW 20 MIN: CPT | Performed by: REGISTERED NURSE

## 2022-02-24 PROCEDURE — G0463 HOSPITAL OUTPT CLINIC VISIT: HCPCS

## 2022-02-24 NOTE — PROGRESS NOTES
"Chief Complaint: IUD check  Subjective: 36 year old  presents for  IUD check . IUD was placed on 2021 and has been verified in place previously.   Patient was scheduled for IUD string check but is not concerned about that today; presents with concerns about LLQ pain. Uncertain if it is related to her IUD or if it is a GI issue. Seeing gastroenterologist on 3/10 for h/o of constipation; taking miralax at night.   Reports that several days before her period she has pain and cramping in the LLQ, has eliminated dairy because of this. Started a zinc supplement. Having regular monthly periods. After period has finished pain generally improves. Taking ibuprofen PRN but doesn't want to be consistently taking this. Describes pain as cramping, dull, constant. Wants to rule out pelvic abnormality/IUD as cause.   O:   Vitals:    22 1130   BP: 104/69   BP Location: Left arm   Patient Position: Chair   Pulse: 71   Weight: 52.5 kg (115 lb 11.2 oz)   Height: 1.626 m (5' 4\")     -VS: reviewed and within normal limits   -General appearance: no acute distress, patient is comfortable   NEUROLOGICAL/PSYCHIATRIC   - Orientated x3,   -Mood and affect: : normal   PELVIC EXAM:  GENITALIA:  External genitalia, Bartholin's glands, urethra & [place]'s glands:normal. Vulva reveals no erythema or lesions.         VAGINA:  pink, normal rugae and discharge, no lesions, good tone.   CERVIX:  smooth, without discharge or CMT.  IUD strings easily palpated.              UTERUS: Anteverted and Midposition,  nontender.  ADNEXA:  Without masses or tenderness.   RECTAL:  Normal appearance.  Digital exam deferred  Assessment/Plan   1. Intrauterine device surveillance  2. LLQ abdominal pain     - IUD strings checked, in place.   - Reviewed pelvic ultrasound from 2021 with patient and normal findings on bimanual exam today, pt prefers repeat pelvic ultrasound, orders placed.   - Follow-up with scheduled GI appointment on 3/10.  - Continue " ibuprofen PRN, can use heat pack, tylenol PRN.    - Reviewed how/why to contact provider for increasing abdominal pain   RTC annually/sooner prn.     JOEY KimbleM

## 2022-02-24 NOTE — LETTER
"2022       RE: Gabriela Chaudhry  [address]  [place] [address]     Dear Colleague,    Thank you for referring your patient, Gabriela Chaudhry, to the [facility] [place] at . Please see a copy of my visit note below.    Chief Complaint: IUD check  Subjective: 36 year old  presents for  IUD check . IUD was placed on 2021 and has been verified in place previously.   Patient was scheduled for IUD string check but is not concerned about that today; presents with concerns about LLQ pain. Uncertain if it is related to her IUD or if it is a GI issue. Seeing gastroenterologist on 3/10 for h/o of constipation; taking miralax at night.   Reports that several days before her period she has pain and cramping in the LLQ, has eliminated dairy because of this. Started a zinc supplement. Having regular monthly periods. After period has finished pain generally improves. Taking ibuprofen PRN but doesn't want to be consistently taking this. Describes pain as cramping, dull, constant. Wants to rule out pelvic abnormality/IUD as cause.   O:   Vitals:    22 1130   BP: 104/69   BP Location: Left arm   Patient Position: Chair   Pulse: 71   Weight: 52.5 kg (115 lb 11.2 oz)   Height: 1.626 m (5' 4\")     -VS: reviewed and within normal limits   -General appearance: no acute distress, patient is comfortable   NEUROLOGICAL/PSYCHIATRIC   - Orientated x3,   -Mood and affect: : normal   PELVIC EXAM:  GENITALIA:  External genitalia, Bartholin's glands, urethra & [place] glands:normal. Vulva reveals no erythema or lesions.         VAGINA:  pink, normal rugae and discharge, no lesions, good tone.   CERVIX:  smooth, without discharge or CMT.  IUD strings easily palpated.              UTERUS: Anteverted and Midposition,  nontender.  ADNEXA:  Without masses or tenderness.   RECTAL:  Normal appearance.  Digital exam " deferred  Assessment/Plan   1. Intrauterine device surveillance  2. LLQ abdominal pain     - IUD strings checked, in place.   - Reviewed pelvic ultrasound from 5/2021 with patient and normal findings on bimanual exam today, pt prefers repeat pelvic ultrasound, orders placed.   - Follow-up with scheduled GI appointment on 3/10.  - Continue ibuprofen PRN, can use heat pack, tylenol PRN.    - Reviewed how/why to contact provider for increasing abdominal pain   RTC annually/sooner prn.     JOEY Kimble CNM

## 2022-03-11 ENCOUNTER — ANCILLARY PROCEDURE (OUTPATIENT)
Dept: ULTRASOUND IMAGING | Facility: CLINIC | Age: 37
End: 2022-03-11
Attending: REGISTERED NURSE
Payer: COMMERCIAL

## 2022-03-11 DIAGNOSIS — Z30.431 INTRAUTERINE DEVICE SURVEILLANCE: ICD-10-CM

## 2022-03-11 DIAGNOSIS — R10.32 LLQ ABDOMINAL PAIN: ICD-10-CM

## 2022-03-11 PROCEDURE — 76830 TRANSVAGINAL US NON-OB: CPT

## 2022-03-11 PROCEDURE — 76830 TRANSVAGINAL US NON-OB: CPT | Mod: 26 | Performed by: OBSTETRICS & GYNECOLOGY

## 2022-06-07 ENCOUNTER — OFFICE VISIT (OUTPATIENT)
Dept: FAMILY MEDICINE | Facility: CLINIC | Age: 37
End: 2022-06-07
Payer: COMMERCIAL

## 2022-06-07 VITALS
WEIGHT: 110.9 LBS | DIASTOLIC BLOOD PRESSURE: 68 MMHG | BODY MASS INDEX: 18.93 KG/M2 | HEIGHT: 64 IN | SYSTOLIC BLOOD PRESSURE: 87 MMHG | TEMPERATURE: 96.9 F | RESPIRATION RATE: 14 BRPM | OXYGEN SATURATION: 98 % | HEART RATE: 78 BPM

## 2022-06-07 DIAGNOSIS — R10.32 ABDOMINAL PAIN, LEFT LOWER QUADRANT: Primary | ICD-10-CM

## 2022-06-07 DIAGNOSIS — Z87.19 HISTORY OF CONSTIPATION: ICD-10-CM

## 2022-06-07 LAB
CLUE CELLS: ABNORMAL
TRICHOMONAS, WET PREP: ABNORMAL
WBC'S/HIGH POWER FIELD, WET PREP: ABNORMAL
YEAST, WET PREP: ABNORMAL

## 2022-06-07 PROCEDURE — 87591 N.GONORRHOEAE DNA AMP PROB: CPT | Performed by: FAMILY MEDICINE

## 2022-06-07 PROCEDURE — 36415 COLL VENOUS BLD VENIPUNCTURE: CPT | Performed by: FAMILY MEDICINE

## 2022-06-07 PROCEDURE — 87210 SMEAR WET MOUNT SALINE/INK: CPT | Performed by: FAMILY MEDICINE

## 2022-06-07 PROCEDURE — 99213 OFFICE O/P EST LOW 20 MIN: CPT | Mod: 95 | Performed by: FAMILY MEDICINE

## 2022-06-07 PROCEDURE — 86364 TISS TRNSGLTMNASE EA IG CLAS: CPT | Performed by: FAMILY MEDICINE

## 2022-06-07 PROCEDURE — 87491 CHLMYD TRACH DNA AMP PROBE: CPT | Performed by: FAMILY MEDICINE

## 2022-06-07 NOTE — PROGRESS NOTES
Assessment & Plan     Abdominal pain, left lower quadrant  - Wet prep - lab collect; Future  - Chlamydia & Gonorrhea by PCR, [facility] - Clinic Collect  - Tissue transglutaminase eb IgA and IgG; Future  - Wet prep - lab collect  - Tissue transglutaminase eb IgA and IgG    History of constipation    I recommended we check labs as listed above.  The underlying cause of her left lower abdomen/pelvic pain symptoms is not entirely clear.  We reviewed possibilities including endometriosis or discomfort from the IUD since her symptoms started shortly after this was inserted.  Her symptoms may also be due to IBS.  We discussed strategies that can help with this.  She may continue MiraLAX as needed to help with constipation symptoms and continue eating a healthy high-fiber diet and staying very well-hydrated.  I suggested restarting sertraline which she is not interested in doing at this time.  She was on a very low dose (25 mg daily) this past year, but stopped in March.  I recommended she follow-up with GI and OB/GYN.  She may need to have the IUD removed to see if this helps.        40 minutes spent on the date of the encounter doing chart review, review of test results, patient visit and documentation            Return in about 3 months (around 9/7/2022) for Follow up if symptoms not improving..    Nelson Corral, [facility]   Juanpablo is a 37 year old who presents for the following health issues     History of Present Illness       Reason for visit:  Lower left ab pain    She eats 0-1 servings of fruits and vegetables daily.She consumes 0 sweetened beverage(s) daily.She exercises with enough effort to increase her heart rate 10 to 19 minutes per day.  She exercises with enough effort to increase her heart rate 4 days per week.   She is taking medications regularly.       Pain History:  When did you first notice your pain? -She describes having constant pain in the left  lower abdomen/pelvis for approximately 1 year.  Have you seen anyone else for your pain? Yes  Where in your body do you have pain? Abdominal/Flank Pain  Onset/Duration: For almost year after she got her chintan IUD in April 2021  Description:   Character: Gnawing and Cramping  Location: left lower quadrant  Radiation: None  Intensity: mild, moderate, severe at times  Progression of Symptoms:  intermittent  Accompanying Signs & Symptoms:  Fever/Chills: no  Gas/Bloating: YES  Nausea: no  Vomitting: no  Diarrhea: no  Constipation: no  Dysuria or Hematuria: YES  History:   Trauma: no  Previous similar pain: YES  Previous tests done: Pelvic ultrasounds, CT scanning of the abdomen/pelvis and Colonoscopy results have all been unremarkable with the exception that the CT scan showed fecalization of the mid to distal small bowel suggesting chronic delayed intestinal transit  Precipitating factors:   Does the pain change with:     Food: does not know    Bowel Movement: no    Urination: no   Other factors:  no  Therapies tried and outcome: ibuprofen   Patient's last menstrual period was 05/26/2022.            She also has a history of anxiety and used to be on sertraline 25 mg daily which she stopped a few months ago.  She admits that she has been under quite a bit of stress related to going through a divorce.  She is also worried about what the pain in her left lower abdomen/pelvic region may be caused by.  She is wondering if this could be endometriosis or due to the IUD or something else more serious.    CT Abdomen Pelvis 3/21/22  IMPRESSION:   1.  No acute findings in the abdomen or pelvis to explain the patient's symptoms. No evidence of a bowel obstruction, inflammatory process or hydronephrosis.   2.  Fecalization of the mid to distal small bowel contents suggesting chronic delayed intestinal transit.      Review of Systems         Objective    BP (!) 87/68   Pulse 78   Temp 96.9  F (36.1  C)   Resp 14   Ht 1.626 m  "(5' 4\")   Wt 50.3 kg (110 lb 14.4 oz)   LMP 05/26/2022   SpO2 98%   BMI 19.04 kg/m    Body mass index is 19.04 kg/m .  Physical Exam                   "

## 2022-06-08 LAB
C TRACH DNA SPEC QL PROBE+SIG AMP: NEGATIVE
N GONORRHOEA DNA SPEC QL NAA+PROBE: NEGATIVE
TTG IGA SER-ACNC: 0.6 U/ML
TTG IGG SER-ACNC: <0.6 U/ML

## 2022-06-25 ENCOUNTER — HEALTH MAINTENANCE LETTER (OUTPATIENT)
Age: 37
End: 2022-06-25

## 2022-09-07 ENCOUNTER — NURSE TRIAGE (OUTPATIENT)
Dept: NURSING | Facility: CLINIC | Age: 37
End: 2022-09-07

## 2022-09-07 NOTE — TELEPHONE ENCOUNTER
"Dealing with Anxiety for a few weeks  8/26 was put on Buspar   -did that and was not having issues with sleep   Was told she could take up to 3 per day PRN for worsening anxiety    Was not having an issue with sleep prior to taking Buspar at all. Felt like the anxiety was wearing her out and making her exhausted.     She took 3 on Friday, and again on Monday    Did not sleep Friday   Slept on Saturday  Did sleep Sunday   Couldn't sleep Monday     She did not take 3 today, but she can't sleep tonight     Has a close friend who was taking zoloft for depression and then was having issue sleeping.  She was given Clonapin for sleep. She gave the patient some broken up pieces of hers.   -Patient asks if she can take these  -Advised that we cannot ever advise taking someone else's medications.     Patient is now having anxiety about not sleeping.     Triaged to a disposition of Call PCP within 24 hours. Patient is going to make an appt.     Home care advice given.         Reason for Disposition    Requesting medication for sleep (\"sleeping pill\")    Difficulty falling to sleep or staying asleep    Additional Information    Negative: Difficulty breathing    Negative: Depression is suspected    Negative: Traumatic Brain Injury (TBI) is suspected    Negative: Suspected alcohol withdrawal or unhealthy use of alcohol (drinking too much)    Negative: Suspected substance use (drug use), addiction, or withdrawal    Negative: [1] Pain is causing insomnia AND [2] pain is not a chronic symptom (recurrent or ongoing AND present > 4 weeks)    Protocols used: INSOMNIA-A-    Kaitlin May RN on 9/7/2022 at 1:41 AM      "

## 2022-11-20 ENCOUNTER — HEALTH MAINTENANCE LETTER (OUTPATIENT)
Age: 37
End: 2022-11-20

## 2023-01-09 ENCOUNTER — OFFICE VISIT (OUTPATIENT)
Dept: OBGYN | Facility: CLINIC | Age: 38
End: 2023-01-09
Payer: COMMERCIAL

## 2023-01-09 VITALS
SYSTOLIC BLOOD PRESSURE: 99 MMHG | HEART RATE: 72 BPM | BODY MASS INDEX: 19.97 KG/M2 | HEIGHT: 64 IN | OXYGEN SATURATION: 99 % | WEIGHT: 117 LBS | DIASTOLIC BLOOD PRESSURE: 60 MMHG

## 2023-01-09 DIAGNOSIS — Z00.00 ANNUAL PHYSICAL EXAM: Primary | ICD-10-CM

## 2023-01-09 PROCEDURE — 87624 HPV HI-RISK TYP POOLED RSLT: CPT | Performed by: OBSTETRICS & GYNECOLOGY

## 2023-01-09 PROCEDURE — 99385 PREV VISIT NEW AGE 18-39: CPT | Performed by: OBSTETRICS & GYNECOLOGY

## 2023-01-09 PROCEDURE — G0145 SCR C/V CYTO,THINLAYER,RESCR: HCPCS | Performed by: OBSTETRICS & GYNECOLOGY

## 2023-01-09 RX ORDER — HYDROXYZINE PAMOATE 100 MG
50 CAPSULE ORAL
COMMUNITY

## 2023-01-09 ASSESSMENT — PATIENT HEALTH QUESTIONNAIRE - PHQ9
5. POOR APPETITE OR OVEREATING: SEVERAL DAYS
SUM OF ALL RESPONSES TO PHQ QUESTIONS 1-9: 5

## 2023-01-09 ASSESSMENT — ANXIETY QUESTIONNAIRES
GAD7 TOTAL SCORE: 8
3. WORRYING TOO MUCH ABOUT DIFFERENT THINGS: SEVERAL DAYS
5. BEING SO RESTLESS THAT IT IS HARD TO SIT STILL: SEVERAL DAYS
7. FEELING AFRAID AS IF SOMETHING AWFUL MIGHT HAPPEN: SEVERAL DAYS
6. BECOMING EASILY ANNOYED OR IRRITABLE: MORE THAN HALF THE DAYS
GAD7 TOTAL SCORE: 8
1. FEELING NERVOUS, ANXIOUS, OR ON EDGE: SEVERAL DAYS
2. NOT BEING ABLE TO STOP OR CONTROL WORRYING: SEVERAL DAYS

## 2023-01-09 NOTE — PROGRESS NOTES
Gabriela is a 37 year old  female who presents for annual exam.     Menses are regular q 28-30 days and normal lasting 5-7 days.  Menses flow: normal.  Patient's last menstrual period was 2022.. Using IUD for contraception.  She is not currently considering pregnancy.  Besides routine health maintenance, she has no other health concerns today.  Paragard inserted May or 2021?  No issues with it.  Regular periods.  Recently told she may have Mittelschmerz.  Had work-up for mid-cycle unilateral abdominal/pelvic pain and it was all negative.  Has friend who works at [employer] who suggested that as possible diagnosis.  GYNECOLOGIC HISTORY:  Menarche: 16  Age at first intercourse: 27 Number of lifetime partners: less than 6  Gabriela is sexually active with 1male partner(s) and is currently in monogamous relationship with boyfriend.    History sexually transmitted infections:No STD history  STI testing offered?  Declined  MELISA exposure: No  History of abnormal Pap smear: NO - age 30-65 PAP every 5 years with negative HPV co-testing recommended  Family history of breast CA: No  Family history of uterine/ovarian CA: Yes (Please explain): p aunt     Family history of colon CA: Yes (Please explain): m uncle    HEALTH MAINTENANCE:  Cholesterol: (No results found for: CHOL History of abnormal lipids: No  Mammo:  . History of abnormal Mammo: Not applicable.  Regular Self Breast Exams: Yes  Calcium/Vitamin D intake: source:  dairy, dietary supplement(s) Adequate? Yes  TSH: (  TSH   Date Value Ref Range Status   2013 1.32 0.4 - 5.0 mU/L Final    )  Pap; (  Lab Results   Component Value Date    PAP NIL 10/03/2014    PAP NIL 2011    )    HISTORY:  OB History    Para Term  AB Living   0 0 0 0 0 0   SAB IAB Ectopic Multiple Live Births   0 0 0 0 0     Past Medical History:   Diagnosis Date     Anxiety      Other and unspecified ovarian cyst 2011    [place] [facility] [place]     Past Surgical History:   Procedure Laterality Date     NO HISTORY OF SURGERY       Family History   Problem Relation Age of Onset     Alzheimer Disease Maternal Grandfather      Genitourinary Problems Paternal Grandmother         Kidney failure     Social History     Socioeconomic History     Marital status: Single     Spouse name: None     Number of children: None     Years of education: None     Highest education level: None   Occupational History     Employer: [employer]UD[employer]     Occupation: Banker     Employer: [employer]   Tobacco Use     Smoking status: Never     Smokeless tobacco: Never   Substance and Sexual Activity     Alcohol use: No     Alcohol/week: 0.0 standard drinks     Drug use: No     Sexual activity: Yes     Birth control/protection: Pill     Comment: have not been since july 2011   Other Topics Concern     Parent/sibling w/ CABG, MI or angioplasty before 65F 55M? No   Social History Narrative    Caffeine intake/servings daily - 0    Calcium intake/servings daily - 1    Exercise 1-2 times weekly - describe  cardio    Sunscreen used - No    Seatbelts used - Yes    Guns stored in the home - No    Self Breast Exam - No    Pap test up to date -  No    Eye exam up to date -  No    Dental exam up to date -  No    DEXA scan up to date -  Not Applicable    Flex Sig/Colonoscopy up to date -  Not Applicable    Mammography up to date -  Not Applicable    Immunizations reviewed and up to date - Yes 2006    Abuse: Current or Past (Physical, Sexual or Emotional) - No    Do you feel safe in your environment - Yes    Do you cope well with stress - Yes    Do you suffer from insomnia - No    Last updated by: Joanne Gilligan  7/12/2011                   Current Outpatient Medications:      hydrOXYzine (VISTARIL) 100 MG capsule, , Disp: , Rfl:      paragard intrauterine copper device, 1 each by Intrauterine route once, Disp: , Rfl:      sertraline (ZOLOFT) 25 MG tablet, Take 25 mg by mouth daily, Disp: , Rfl:       "spironolactone (ALDACTONE) 100 MG tablet, , Disp: , Rfl:      tazarotene (TAZORAC) 0.1 % external cream, , Disp: , Rfl:      Allergies   Allergen Reactions     Nkda [No Known Drug Allergies]        Past medical, surgical, social and family history were reviewed and updated in EPIC.    ROS:   C:     NEGATIVE for fever, chills, change in weight  I:       NEGATIVE for worrisome rashes, moles or lesions  E:     NEGATIVE for vision changes or irritation  E/M: NEGATIVE for ear, mouth and throat problems  R:     NEGATIVE for significant cough or SOB  CV:   NEGATIVE for chest pain, palpitations or peripheral edema  GI:     NEGATIVE for nausea, abdominal pain, heartburn, or change in bowel habits  :   NEGATIVE for frequency, dysuria, hematuria, vaginal discharge, or irregular bleeding  M:     NEGATIVE for significant arthralgias or myalgia  N:      NEGATIVE for weakness, dizziness or paresthesias  E:      NEGATIVE for temperature intolerance, skin/hair changes  P:      NEGATIVE for changes in mood or affect.    EXAM:  BP 99/60   Pulse 72   Ht 1.626 m (5' 4\")   Wt 53.1 kg (117 lb)   LMP 01/06/2022   SpO2 99%   BMI 20.08 kg/m     BMI: Body mass index is 20.08 kg/m .  Constitutional: healthy, alert and no distress  Head: Normocephalic. No masses, lesions, tenderness or abnormalities  Neck: Neck supple. Trachea midline. No adenopathy. Thyroid symmetric, normal size.   Cardiovascular: RRR.   Respiratory: Negative.   Breast: No nodularity, asymmetry or nipple discharge bilaterally.  Gastrointestinal: Abdomen soft, non-tender, non-distended. No masses, organomegaly.  :  Vulva:  No external lesions, normal female hair distribution, no inguinal adenopathy.    Urethra:  Midline, non-tender, well supported, no discharge  Vagina:  Moist, pink, no abnormal discharge, no lesions.  Cervix WNL.  IUD strings appear appropriate length  Rectal Exam: deferred  Musculoskeletal: extremities normal  Skin: no suspicious lesions or " rayna  Psychiatric: Affect appropriate, cooperative,mentation appears normal.     COUNSELING:   Reviewed preventive health counseling, as reflected in patient instructions   reports that she has never smoked. She has never used smokeless tobacco.    Body mass index is 20.08 kg/m .    FRAX Risk Assessment    ASSESSMENT:  37 year old female with satisfactory annual exam  (Z00.00) Annual physical exam  (primary encounter diagnosis)  Comment: Normal exam today.  Pap updated.  Denies hx of abnormal pap.  Doing well with IUD  Plan: Pap screen with HPV - recommended age 30 - 65         years          RTC 1 year and prn.    Debbie Retana MD

## 2023-01-11 LAB
BKR LAB AP GYN ADEQUACY: NORMAL
BKR LAB AP GYN INTERPRETATION: NORMAL
BKR LAB AP HPV REFLEX: NORMAL
BKR LAB AP LMP: NORMAL
BKR LAB AP PREVIOUS ABNORMAL: NORMAL
PATH REPORT.COMMENTS IMP SPEC: NORMAL
PATH REPORT.COMMENTS IMP SPEC: NORMAL
PATH REPORT.RELEVANT HX SPEC: NORMAL

## 2023-01-13 LAB
HUMAN PAPILLOMA VIRUS 16 DNA: NEGATIVE
HUMAN PAPILLOMA VIRUS 18 DNA: NEGATIVE
HUMAN PAPILLOMA VIRUS FINAL DIAGNOSIS: NORMAL
HUMAN PAPILLOMA VIRUS OTHER HR: NEGATIVE

## 2023-03-31 ENCOUNTER — APPOINTMENT (OUTPATIENT)
Dept: LAB | Facility: CLINIC | Age: 38
End: 2023-03-31
Payer: COMMERCIAL

## 2023-04-29 ENCOUNTER — NURSE TRIAGE (OUTPATIENT)
Dept: NURSING | Facility: CLINIC | Age: 38
End: 2023-04-29
Payer: COMMERCIAL

## 2023-04-29 NOTE — TELEPHONE ENCOUNTER
Nurse Triage SBAR    Situation: IUD Question    Background: Patient calling. Patient has an IUD, placed in 2021. Patient is getting her IUD out on Friday.     Assessment: Patient has a lot of cramping with the IUD. Takes ibuprofen. Clear discharge. Abdominal pain is mostly on the lower left side. Constant for over the last 2 hours.     Protocol Recommended Disposition: See Physician within 4 hours (Or PCP Triage)    Recommendation: According to the protocol, Patient should be seen within 4 hours (Or PCP Triage). Advised Patient to be seen within 4 hours (Or PCP Triage). Care advice given. Patient verbalizes understanding but did not state if she will go in to be evaluated. Reviewed concerning symptoms and when to call back.    Cleo William RN Nursing Advisor 4/29/2023 6:28 PM     Reason for Disposition    [1] Constant abdominal pain AND [2] present > 2 hours    Additional Information    Negative: Shock suspected (e.g., cold/pale/clammy skin, too weak to stand, low BP, rapid pulse)    Negative: Sounds like a life-threatening emergency to the triager    Negative: [1] Abdominal pain AND [2] pregnant < 20 weeks    Negative: [1] Vaginal bleeding AND [2] pregnant < 20 weeks    Negative: Vaginal discharge is main symptom    Negative: [1] SEVERE abdominal pain (e.g., excruciating) AND [2] present > 1 hour    Negative: SEVERE vaginal bleeding (e.g., soaking 2 pads or tampons per hour and present 2 or more hours; 1 menstrual cup every 2 hours)    Negative: SEVERE dizziness (e.g., unable to stand, requires support to walk, feels like passing out now)    Negative: Patient sounds very sick or weak to the triager    Negative: MODERATE vaginal bleeding (e.g., soaking 1 pad or tampon per hour and present > 6 hours; 1 menstrual cup every 6 hours)    Negative: [1] Vaginal bleeding is WORSE than normal (e.g., heavier) AND [2] dizziness or lightheadedness    Protocols used: CONTRACEPTION - IUD SYMPTOMS AND [id number]

## 2023-05-02 ENCOUNTER — OFFICE VISIT (OUTPATIENT)
Dept: FAMILY MEDICINE | Facility: CLINIC | Age: 38
End: 2023-05-02
Payer: COMMERCIAL

## 2023-05-02 VITALS
SYSTOLIC BLOOD PRESSURE: 91 MMHG | HEART RATE: 67 BPM | DIASTOLIC BLOOD PRESSURE: 63 MMHG | OXYGEN SATURATION: 100 % | TEMPERATURE: 98 F | RESPIRATION RATE: 16 BRPM | HEIGHT: 64 IN | WEIGHT: 113.75 LBS | BODY MASS INDEX: 19.42 KG/M2

## 2023-05-02 DIAGNOSIS — Z30.432 ENCOUNTER FOR IUD REMOVAL: ICD-10-CM

## 2023-05-02 DIAGNOSIS — R10.32 ABDOMINAL PAIN, LEFT LOWER QUADRANT: Primary | ICD-10-CM

## 2023-05-02 PROBLEM — Z97.5 IUD (INTRAUTERINE DEVICE) IN PLACE: Status: RESOLVED | Noted: 2021-04-06 | Resolved: 2023-05-02

## 2023-05-02 PROCEDURE — 87491 CHLMYD TRACH DNA AMP PROBE: CPT | Performed by: FAMILY MEDICINE

## 2023-05-02 PROCEDURE — 99212 OFFICE O/P EST SF 10 MIN: CPT | Mod: 25 | Performed by: FAMILY MEDICINE

## 2023-05-02 PROCEDURE — 58301 REMOVE INTRAUTERINE DEVICE: CPT | Performed by: FAMILY MEDICINE

## 2023-05-02 PROCEDURE — 87591 N.GONORRHOEAE DNA AMP PROB: CPT | Performed by: FAMILY MEDICINE

## 2023-05-02 NOTE — PROGRESS NOTES
"  Assessment & Plan     Abdominal pain, left lower quadrant  Left lower quadrant abdominal pain seems to have coincided with ParaGard IUD placement about 2 years ago.  Has had good work-up with no other certain cause found and it is bothering her enough that she wants to have a trial off of the IUD in case that is the cause.  She will use condoms in the meantime for contraception.  If she were to have no change in her pain and she wants another ParaGard IUD placed, she could check with her OB/GYN regarding whether this could be done with sedation in the future, as it was quite painful when initially inserted.    Encounter for IUD removal  ParaGard IUD uneventfully removed today for the above reasons.        21 minutes spent by me on the date of the encounter doing chart review, review of test results, patient visit and documentation            Viola Hernandez MD  [facility] ZORAN Ochoa is a 38 year old, presenting for the following health issues:  IUD (Removal )        5/2/2023     2:13 PM   Additional Questions   Roomed by Luiza RINALDI     This is a 38-year-old patient who usually goes to another clinic who is here for potential IUD removal.  She had left lower quadrant pain ongoing for many months with comprehensive work-up including colonoscopy and imaging and continues to have pain.  And seems to start after her menstrual period but then comes and goes.  Sometimes it is pretty severe, sometimes more of an annoyance.  She was told at 1 point it might be mittelschmerz, that is happening more than would be expected just with that.  The pain is definitely not just with menstrual periods.  She takes ibuprofen that helps temporarily but is having to take it quite frequently.            Review of Systems         Objective    BP 91/63   Pulse 67   Temp 98  F (36.7  C) (Oral)   Resp 16   Ht 1.626 m (5' 4\")   Wt 51.6 kg (113 lb 12 oz)   SpO2 100%   BMI 19.53 kg/m    Body mass " index is 19.53 kg/m .  Physical Exam   General: Awake alert no acute distress  : Normal female external genitalia.  Speculum exam reveals normal-appearing nulliparous cervix with white IUD strings about 2 cm in length.    Procedure: After risks and benefits discussed patient did wish to have the ParaGard IUD removed today.  This was removed by using a ring forceps to grab the strings and firmly would gently remove the IUD.  The IUD appeared intact and was removed uneventfully.

## 2023-05-03 LAB
C TRACH DNA SPEC QL PROBE+SIG AMP: NEGATIVE
N GONORRHOEA DNA SPEC QL NAA+PROBE: NEGATIVE

## 2023-07-11 ENCOUNTER — NURSE TRIAGE (OUTPATIENT)
Dept: NURSING | Facility: CLINIC | Age: 38
End: 2023-07-11
Payer: COMMERCIAL

## 2023-07-11 NOTE — TELEPHONE ENCOUNTER
Patient takes 100 mg of spironolactone daily for acne.  Patient has quit taking and wondering if this will cause her to have a heart attack. Triage nurse unable to find that information and suggested she contact a pharmacy.  Patient verbalized understanding of care advice.    Reason for Disposition    [1] Caller requesting NON-URGENT health information AND [2] PCP's office is the best resource    Protocols used: INFORMATION ONLY CALL - NO TRIAGE-A-

## 2024-02-06 NOTE — TELEPHONE ENCOUNTER
[facility] AMBULATORY ENCOUNTER  PAIN MANAGEMENT FOLLOW UP    PROVIDERS:   PCP: Aidan Fan MD   NS:  Quentin Garcia DO     CHIEF COMPLAINT:  Follow-up (SCS trail post op ) and Back Pain     SUBJECTIVE:    Lindy Shelby is a 81year old female with a past medical history of depression, GERD, IBS, thyroid disease, OAB s/p Interstim Michellezenobia Harding, 9/21/21), atrial fibrillation, B knee osteoarthritis s/p B TKA and chronic low back pain s/p L4-5 fusion (2014) who was seen today as for follow-up for low back pain. She was last seen in clinic on 11/28/23. At that time, we continued her gabapentin 600 mg TID and discussed increasing to 600 mg QID vs switching to Lyrica if no relief. We also referred her to Dr. Shama Huntley for SCS trial clearance. On 02/01/24, patient underwent Medtronic SCS trial with Dr. Haja Roa. Today, she reports 80% relief during the trial. She was able to better tolerate walking and has been sleeping much better. Currently, the pain is located in the low back with radiation into the left lower extremity. The pain is described as stabbing, numb, burning and tingling in character. It is rated 1/10 and is constant. The pain is worse with sitting and improved with walking and lying down. Patient reports numbness/tingling/weakness in the left lower extremity. She denies bowel/bladder incontinence, saddle anesthesia, recent fever, infection, or antibiotic use. Patient not does not have a history of cancer.       PAIN COURSE AND PREVIOUS INTERVENTIONS:  Medications: -M. Relaxers:    -Neuropathics:  Gabapentin, Cymbalta    -NSAIDs:     -Opioids:     -Other:    Interventions: -02/01/23 Medtronic SCS trial 80% relief    -10/17/23 L L5/S1 TFESI temporary benefit    Adjuvants: None    BLOOD THINNING MEDICATIONS:  Xarelto    Spine Surgical History:  L4-5 decompression/laminectomy Wilfredo Weston, 2014)    Mental Health/Substance Use History:  Depression    Pertinent Comorbidities:  None    MEDICATIONS:    Current
Patient asked to speak with a nurse regarding test results from yesterdays 9/7 appointment. Please return call.  
Outpatient Medications   Medication Sig Dispense Refill    flecainide (TAMBOCOR) 100 MG tablet TAKE 1 TABLET BY MOUTH IN THE  MORNING AND 1 TABLET BY MOUTH IN THE EVENING 180 tablet 0    gabapentin (NEURONTIN) 600 MG tablet Take 1 tablet by mouth every morning, Take 1 tablet at noon, Take 1 tablet every evening 90 tablet 0    DULoxetine (CYMBALTA) 60 MG capsule Take 1 capsule by mouth daily. (Patient taking differently: Take 60 mg by mouth in the morning and 60 mg in the evening.) 30 capsule 2    nitrofurantoin, macrocrystal-monohydrate, (MACROBID) 100 MG capsule Take 1 capsule by mouth 2 times daily with food for 7 days      metoPROLOL succinate (TOPROL-XL) 25 MG 24 hr tablet TAKE 1 TABLET BY MOUTH ONCE  DAILY 90 tablet 1    rivaroxaban (Xarelto) 20 MG Tab Take 1 tablet by mouth daily (with dinner). 90 tablet 1    pramipexole (MIRAPEX) 0.25 MG tablet Take 1 tablet by mouth in the morning and 1 tablet at noon and 1 tablet in the evening. 180 tablet 5    amoxicillin (AMOXIL) 500 MG capsule Take 4 capsules 1 hour prior to dental appointment 4 capsule 4    triamcinolone (KENALOG) 0.025 % ointment Apply topically 2 times daily as needed (rash). Apply to face bid 15 g 1    clobetasol (TEMOVATE) 0.05 % ointment Apply to hands  bid (Patient taking differently: as needed. Apply to hands  bid) 30 g 0    hydroCORTisone (CORTIZONE) 2.5 % cream Apply 1 application topically 2 times daily. Very thinly  Twice daily on the affected areas of the eyelids, avoid applying too close to eyes x 5 days, update after 5 days 30 g 0    betamethasone valerate (VALISONE) 0.1 % cream Apply thinly to affected areas 2x daily as needed 30 g 0    acetaminophen (TYLENOL) 500 MG tablet Take 1,000 mg by mouth as needed. B Complex Vitamins (BL VITAMIN B COMPLEX PO) Take by mouth daily. cholecalciferol (VITAMIN D3) 1000 UNIT tablet Take 1,000 Units by mouth daily. Multiple Vitamin (MULTIVITAMIN) capsule Take 1 capsule by mouth daily.
No current facility-administered medications for this visit. PROBLEM LIST:    Patient Active Problem List   Diagnosis    Spinal stenosis, lumbar    Paroxysmal atrial fibrillation (CMS/HCC)    Obesity    Sigmoid Diverticulosis    Contact dermatitis and other eczema, due to unspecified cause    Meralgia paresthetica    Restless legs syndrome    Fibromyalgia    Prosthetic joint infection (CMD)    Overactive bladder    Urge incontinence of urine       HISTORIES:    ALLERGIES:   Allergen Reactions    Bactrim Ds SWELLING     Presumed allergy/adverse reaction. Patient developed hand swelling.     Ceftriaxone PRURITUS and Other (See Comments)     Drug fever       Past Medical History:   Diagnosis Date    Allergy     Anemia     Arthritis     Arthropathy (NOS)     Atrial fibrillation (CMD) 2008,    cardioverted     Chronic pain     Depression     Gastroesophageal reflux disease     History of chickenpox     Inflammatory bowel disease     Neuromuscular disorder (CMD)     No known problems fibralgia, back, knee & shoulder pain    PONV (postoperative nausea and vomiting)     Prosthetic joint infection (CMD) 2018    Sigmoid Diverticulosis 2013    Seen during colonoscopy    Spinal stenosis     Spinal stenosis, lumbar     Thyroid disease     Enlarged Thyroid only    Urinary incontinence     Urinary tract infection         Past Surgical History:   Procedure Laterality Date    Abdomen surgery      Mickels Diverticulum    Arthroscopy knee medial or lat Right 2015    Norberto    Arthroscopy knee medial or lat Left 2015    Norberto    Back surgery  2014    Lumbar lami     section, classic       section, low transverse  06/15/1991    Colonoscopy diagnostic  2013    Dr. Bianca Giang [facility]) Normal Exam & Diverticulosis (10 Year F/U)    Eye surgery  2006    Femur/knee surg unlisted Right 2018    I&D Right total knee with liner exchange Dr Marizol Sy
Foot/toes surgery proc unlisted      Unspecified foot/toes procedure heel spur and artificial joint    Joint replacement   &     Ligmt revision,knee,extra-artic Right 2022    Miscellaneous N/A     Medtronic bladder stimulator    Sinus surgery proc unlisted      Spine surgery  2014    Total knee replacement Right 2016    Norberto    Total knee replacement Left 2018    Dr Sivan Skelton History     Socioeconomic History    Marital status: /Civil Union     Spouse name: Not on file    Number of children: Not on file    Years of education: Not on file    Highest education level: Not on file   Occupational History    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   Tobacco Use    Smoking status: Former     Current packs/day: 0.00     Average packs/day: 0.3 packs/day for 15.0 years (3.7 ttl pk-yrs)     Types: Cigarettes     Start date: 1970     Quit date: 1985     Years since quittin.1    Smokeless tobacco: Never   Vaping Use    Vaping Use: never used   Substance and Sexual Activity    Alcohol use: Yes     Alcohol/week: 2.0 standard drinks of alcohol     Types: 2 Glasses of wine per week     Comment: 5-6 per week    Drug use: No     Comment: 7-10-18    Sexual activity: Not Currently     Partners: Male     Birth control/protection: Pill, Diaphragm     Comment:  .  partially disabled with spinal injury   Other Topics Concern     Service No    Blood Transfusions No    Caffeine Concern Not Asked    Occupational Exposure Not Asked    Hobby Hazards Not Asked    Sleep Concern Not Asked    Stress Concern Not Asked    Weight Concern Not Asked    Special Diet No    Back Care Yes     Comment: S/P lumbar fusion    Exercise Not Asked    Bike Helmet Not Asked    Seat Belt Yes    Self-Exams Yes     Comment: breast self exam   Social History Narrative    2012:  Works in [employer] at [employer] [facility]: Not on file
"  Food Insecurity: Not on file   Transportation Needs: Not on file   Physical Activity: Not on file   Stress: Not on file   Social Connections: Not on file   Interpersonal Safety: Low Risk  (1/30/2024)    Interpersonal Safety     How often physically hurt: Never     How often insulted or talked down to: Never     How often threatened with harm: Never     How often scream or curse at: Never       Family History   Problem Relation Age of Onset    Osteoarthritis Mother     Ophthalmology Mother         Milinda Puff    Thyroid Mother     Heart Mother     Dementia/Alzheimers Mother     Osteoarthritis Father     Other Father         TB from army    Cancer Sister         breast CA    Substance Abuse Sister     Depression Sister     Alcohol Abuse Brother     Alcohol Abuse Brother     Heart disease Brother         leaky valve    Cancer Maternal Aunt         breast CA    NEGATIVE FAMILY HX OF Other         no htn or dm. no ov or colon ca    Early death Son         suicide       I have reviewed the family history and social history as listed in the medical record as obtained by my nursing staff and support staff and agree with their documentation. I have reviewed the patient's pertinent medical records. OBJECTIVE:    PHYSICAL EXAMINATION:   Vitals:   Visit Vitals  Ht 5' 5"" (1.651 m) Comment: stated   Wt 109.8 kg (242 lb) Comment: stated   LMP 07/01/2008   BMI 40.27 kg/mÂ²       Constitutional:  Well-developed, well-nourished female in no acute distress. Gait is with antalgia, without ataxia. Head: normocephalic, atraumatic  ENT: Conjunctiva non-injected  Skin: Warm, dry, intact without rash or lesion. Psych: The patient's mood is normal, and appropriate for the circumstances. Cardiovascular: well-perfused extremities, no peripheral edema  Pulmonary:  Non-labored respirations, no stridor noted  Abdomen: Non-distended  Neurologic: Coordination intact, Facial nerves intact.   Musculoskeletal:   DTRs: Patellar: 2/4 "
bilaterally, Medial Hamstrings: 0/4 bilaterally, Achilles 0/4 bilaterally, no clonus    Sensation: intact to light touch and cold in all dermatomes of bilateral lower extremities    Muscle Strength:  Hip Flexion: 5/5 bilaterally     Knee Extension: 5/5 bilaterally     Ankle Dorsiflexion: 5/5 bilaterally     Extensor Hallucis Longus: 5/5 bilaterally     Ankle Plantar flexion: 5/5 bilaterally    L-Spine: normal lumbar lordosis, no rashes/scars, normal ROM, +pain with lumbar flexion, +tender to palpation left paravertebral musculature, +facet loading on the B with extension and  rotation, +seated slump on the L, +straight leg raise on the L. SI: +tender PSIS on the L, negative TYLER B, [place], and SI joint compression test.     Hips: non-tender GTB B, full PROM without pain, negative log-roll and grind test B.    SCS: Leads removed and intact. Lead entry sites clean/dry/intact, no drainage present. Area cleaned and bandaids applied. Patient advised not to soak in tub/water for 24 hours. LABORATORY DATA:    WBC (10^3/uL)   Date Value   08/04/2023 5.8     HGB (g/dL)   Date Value   08/04/2023 12.0     HCT (%)   Date Value   08/04/2023 37.4     PLT (10^3/uL)   Date Value   08/04/2023 271        Creatinine (mg/dL)   Date Value   08/21/2023 0.72     BUN (mg/dL)   Date Value   08/21/2023 20 (H)     GFR Estimate,  (no units)   Date Value   10/03/2019 >90     AST/SGOT (U/L)   Date Value   08/04/2023 43 (H)     ALT/SGPT (U/L)   Date Value   08/04/2023 24     ALK PHOSPHATASE (U/L)   Date Value   08/04/2023 74     INR (no units)   Date Value   11/04/2022 1.0     Hemoglobin A1C (%)   Date Value   04/30/2015 5.5       IMAGING STUDIES:      CT L-spine 11/02/2023:  INTERPRETATION:  Paraspinal structures: Normal.  Bones: L4 and L5 pedicle screws with dorsal interconnecting hardware which appears intact. Subchondral sclerosis along the L2-3 disc.   Sacro-iliac joints: Vacuum phenomenon and osteophyte
formation in keeping with degenerative change. L5-S1: Mild degenerative disc bulging. Severe facet arthropathy with vacuum phenomena. L4-5: Status post fusion. 7/59 shows apparent right-sided solid lateral mass bony fusion, similar finding seen on the left on 7/40. Dorsal decompression. No spinal canal stenosis. L3-4: Mild disc bulging. Severe facet arthropathy. Mild subarticular recess and foraminal stenosis. L2-3: Vacuum phenomena and intervertebral disc narrowing. Subchondral sclerosis is noted above. Mild facet arthropathy. Mild subarticular recess and moderate foraminal stenosis. L1-2: Vacuum phenomena. Anterior osteophyte formation. Mild facet arthropathy. T12-L1: Normal.  IMPRESSION:  1. Status post L4-5 fusion/decompression with no complication. 2. Degenerative changes at other levels as described above above, worst at L2-3.  3. No fracture, tumor, or infection identified. MRI L-spine 09/20/2023:  1. Lumbar levoscoliosis, apex L3 with multilevel spondylosis and facet arthrosis as noted above. 2. Moderate central canal stenosis and bilateral foraminal stenosis L1-L4 as noted above  3. Foraminal stenosis most severe on the right at L1-2 and L2-3 along with concave margin of the scoliosis. 4. Probable 12 mm calcified synovial cyst encroaching upon the dorsal canal at the L5-S1 level. Consider CT evaluation for better assessment of the bone detail. ASSESSMENT:    1. Failed back syndrome of lumbar spine    2. Lumbar radiculopathy    3. Lumbosacral spondylosis without myelopathy    4. Spinal stenosis of lumbar region with neurogenic claudication         Katie May is a 81year old female with a past medical history of depression, GERD, IBS, thyroid disease, OAB s/p Interstim Robson Null, 9/21/21), atrial fibrillation, B knee osteoarthritis s/p B TKA and chronic low back pain s/p L4-5 fusion (2014) who was seen today as for follow-up for low back pain.       PLAN:  RECOMMENDATIONS:  1) Medical
Modalities:  -Continue gabapentin 600 mg t.i.d. She may titrate up to 600 mg 4 times daily if needed. If no relief consider Lyrica.  -Continue Cymbalta 60 mg daily  2) Interventional Modalities: Schedule fluoroscopy guided Medtronic Intellis Spinal Cord Stimulator permanent implant with 2 thoracic leads for failed back surgery syndrome. Patient takes Xarelto and will need permission to hold it for 3 days prior. Discussed risks and benefits with the patient including: bleeding, infection and nerve damage. 3) Behavioral Medicine Modalities:  None  4) Other Modalities:  Continue HEP  5) Imaging/Labs: None  6) Consults:  Dr. Lamont Thompson recommended SCS trial  7) Follow Up: For SCS implant    Orders Placed This Encounter    [id number]       Instructions provided as documented in the after visit summary. The patient indicated understanding of the diagnosis and agreed with the plan of care.         DONA Kim  Interventional Pain Management  [facility]
no

## 2024-04-13 ENCOUNTER — HEALTH MAINTENANCE LETTER (OUTPATIENT)
Age: 39
End: 2024-04-13

## 2024-05-20 ENCOUNTER — OFFICE VISIT (OUTPATIENT)
Dept: FAMILY MEDICINE | Facility: CLINIC | Age: 39
End: 2024-05-20
Payer: COMMERCIAL

## 2024-05-20 VITALS
HEART RATE: 70 BPM | HEIGHT: 64 IN | OXYGEN SATURATION: 99 % | TEMPERATURE: 98.6 F | BODY MASS INDEX: 18.85 KG/M2 | RESPIRATION RATE: 16 BRPM | SYSTOLIC BLOOD PRESSURE: 100 MMHG | WEIGHT: 110.4 LBS | DIASTOLIC BLOOD PRESSURE: 58 MMHG

## 2024-05-20 DIAGNOSIS — R05.8 POST-VIRAL COUGH SYNDROME: Primary | ICD-10-CM

## 2024-05-20 DIAGNOSIS — R09.89 CHEST WALL SYMPTOM OR COMPLAINT: ICD-10-CM

## 2024-05-20 LAB
ERYTHROCYTE [DISTWIDTH] IN BLOOD BY AUTOMATED COUNT: 12.1 % (ref 10–15)
HCT VFR BLD AUTO: 35.3 % (ref 35–47)
HGB BLD-MCNC: 11.9 G/DL (ref 11.7–15.7)
HOLD SPECIMEN: NORMAL
IRON BINDING CAPACITY (ROCHE): 325 UG/DL (ref 240–430)
IRON SATN MFR SERPL: 12 % (ref 15–46)
IRON SERPL-MCNC: 39 UG/DL (ref 37–145)
MCH RBC QN AUTO: 30.9 PG (ref 26.5–33)
MCHC RBC AUTO-ENTMCNC: 33.7 G/DL (ref 31.5–36.5)
MCV RBC AUTO: 92 FL (ref 78–100)
PLATELET # BLD AUTO: 214 10E3/UL (ref 150–450)
RBC # BLD AUTO: 3.85 10E6/UL (ref 3.8–5.2)
WBC # BLD AUTO: 6.1 10E3/UL (ref 4–11)

## 2024-05-20 PROCEDURE — 99213 OFFICE O/P EST LOW 20 MIN: CPT

## 2024-05-20 PROCEDURE — 84443 ASSAY THYROID STIM HORMONE: CPT

## 2024-05-20 PROCEDURE — 82728 ASSAY OF FERRITIN: CPT

## 2024-05-20 PROCEDURE — 80048 BASIC METABOLIC PNL TOTAL CA: CPT

## 2024-05-20 PROCEDURE — 83540 ASSAY OF IRON: CPT

## 2024-05-20 PROCEDURE — 85027 COMPLETE CBC AUTOMATED: CPT

## 2024-05-20 PROCEDURE — 83550 IRON BINDING TEST: CPT

## 2024-05-20 PROCEDURE — 36415 COLL VENOUS BLD VENIPUNCTURE: CPT

## 2024-05-20 RX ORDER — AZELAIC ACID 0.15 G/G
GEL TOPICAL
COMMUNITY
Start: 2024-04-03

## 2024-05-20 RX ORDER — BENZONATATE 100 MG/1
100 CAPSULE ORAL 3 TIMES DAILY PRN
Qty: 30 CAPSULE | Refills: 0 | Status: SHIPPED | OUTPATIENT
Start: 2024-05-20

## 2024-05-20 RX ORDER — LEVALBUTEROL INHALATION SOLUTION 0.63 MG/3ML
SOLUTION RESPIRATORY (INHALATION)
COMMUNITY
Start: 2024-02-09

## 2024-05-20 RX ORDER — HYDROXYZINE HYDROCHLORIDE 50 MG/1
TABLET, FILM COATED ORAL
COMMUNITY
Start: 2024-03-07

## 2024-05-20 ASSESSMENT — ENCOUNTER SYMPTOMS
COUGH: 1
PALPITATIONS: 0
ABDOMINAL PAIN: 0
SORE THROAT: 1
FATIGUE: 0
FEVER: 0
SHORTNESS OF BREATH: 0

## 2024-05-20 NOTE — PROGRESS NOTES
"  Assessment & Plan     Post-viral cough syndrome  Physical exam today is without concern.  Discussed how postviral cough can last several weeks to months after illness.  No evidence of ongoing infection including no erythema to the pharynx, no exudate, lung sounds clear, ears are without concern, eye exam is without conjunctivitis signs.  Recommend over-the-counter's as needed as well as Tessalon Perles for cough suppression.  Patient agrees with plan.  Vital signs are stable.  If persisting, can be seen again, but again recommended over-the-counter's, increased rest, and increase fluids.  - benzonatate (TESSALON) 100 MG capsule; Take 1 capsule (100 mg) by mouth 3 times daily as needed for cough    Chest wall symptom or complaint  Patient reports \"more awareness of her heartbeat, no chest pain, no palpitations.  It has been an acute change for the patient so we will check labs to see if there is any contributing etiologies.  Check a BMP, TSH, CBC, iron studies.  Cardiac exam reveals regular rate and rhythm, no peripheral edema.  If no obvious etiology persisting could consider further workup.  - Basic metabolic panel  (Ca, Cl, CO2, Creat, Gluc, K, Na, BUN); Future  - TSH with free T4 reflex  - CBC with Platelets and Reflex to Iron Studies  - Iron & Iron Binding Capacity  - Ferritin    Danielle Ochoa is a 39 year old, presenting for the following health issues:  Ear Problem (Check ears), Pharyngitis, and Cough (Left and came back ; had covid 3 weeks ago)        5/20/2024     3:03 PM   Additional Questions   Roomed by Oksana VENTURA MA     History of Present Illness       Reason for visit:  Sore throat ear ache    She eats 2-3 servings of fruits and vegetables daily.She consumes 0 sweetened beverage(s) daily.She exercises with enough effort to increase her heart rate 30 to 60 minutes per day.  She exercises with enough effort to increase her heart rate 5 days per week.   She is taking medications regularly.     Left " "ear and sore throat. Following COVID (3 weeks ago). Symptoms were a dry hacking cough. This initiated the sore throat. Was initially feeling better. But still had a drive cough. This past weekend she starting having a short throat and pain in the left ear when swallowing. No ringing in the ear. Ibuprofen did not help. No trouble swallowing. The cough is still there. Feels like there is something stuck in the throat. Trying to cough this out.  Starts with a tickle in the ear.     More aware of heart beat for about a month. Doesn't feel like it is skipping beats. No pain, just more aware of this.     Review of Systems   Constitutional:  Negative for fatigue and fever.   HENT:  Positive for ear pain and sore throat.    Respiratory:  Positive for cough. Negative for shortness of breath.    Cardiovascular:  Negative for chest pain, palpitations and leg swelling.        \"Heart awareness.\"   Gastrointestinal:  Negative for abdominal pain.         Objective    /58 (BP Location: Right arm, Patient Position: Sitting, Cuff Size: Adult Regular)   Pulse 70   Temp 98.6  F (37  C) (Oral)   Resp 16   Ht 1.626 m (5' 4\")   Wt 50.1 kg (110 lb 6.4 oz)   LMP 05/02/2024 (Exact Date)   SpO2 99%   BMI 18.95 kg/m    Body mass index is 18.95 kg/m .  Physical Exam  Constitutional:       General: She is not in acute distress.  HENT:      Right Ear: Tympanic membrane, ear canal and external ear normal.      Left Ear: Tympanic membrane, ear canal and external ear normal.      Mouth/Throat:      Pharynx: No oropharyngeal exudate or posterior oropharyngeal erythema.   Eyes:      Extraocular Movements: Extraocular movements intact.      Pupils: Pupils are equal, round, and reactive to light.   Cardiovascular:      Rate and Rhythm: Normal rate and regular rhythm.      Heart sounds: Normal heart sounds.   Pulmonary:      Effort: No respiratory distress.      Breath sounds: No wheezing.   Skin:     General: Skin is warm and dry. "   Neurological:      General: No focal deficit present.      Mental Status: She is alert.   Psychiatric:         Mood and Affect: Mood normal.         Thought Content: Thought content normal.        At the end of the visit, I confirmed understanding of what was discussed. Gabriela has no further questions or concerns that were brought up at this time.      Rodrigo Canchola DNP, APRN, FNP-C

## 2024-05-21 LAB
ANION GAP SERPL CALCULATED.3IONS-SCNC: 12 MMOL/L (ref 7–15)
BUN SERPL-MCNC: 19 MG/DL (ref 6–20)
CALCIUM SERPL-MCNC: 9.8 MG/DL (ref 8.6–10)
CHLORIDE SERPL-SCNC: 102 MMOL/L (ref 98–107)
CREAT SERPL-MCNC: 0.72 MG/DL (ref 0.51–0.95)
DEPRECATED HCO3 PLAS-SCNC: 24 MMOL/L (ref 22–29)
EGFRCR SERPLBLD CKD-EPI 2021: >90 ML/MIN/1.73M2
FERRITIN SERPL-MCNC: 52 NG/ML (ref 6–175)
GLUCOSE SERPL-MCNC: 99 MG/DL (ref 70–99)
POTASSIUM SERPL-SCNC: 4.2 MMOL/L (ref 3.4–5.3)
SODIUM SERPL-SCNC: 138 MMOL/L (ref 135–145)
TSH SERPL DL<=0.005 MIU/L-ACNC: 0.97 UIU/ML (ref 0.3–4.2)

## 2024-05-23 ENCOUNTER — TELEPHONE (OUTPATIENT)
Dept: FAMILY MEDICINE | Facility: CLINIC | Age: 39
End: 2024-05-23
Payer: COMMERCIAL

## 2024-05-23 DIAGNOSIS — E61.1 IRON DEFICIENCY: Primary | ICD-10-CM

## 2024-05-23 RX ORDER — FERROUS SULFATE 325(65) MG
325 TABLET ORAL
Qty: 30 TABLET | Refills: 1 | Status: SHIPPED | OUTPATIENT
Start: 2024-05-23

## 2024-05-23 NOTE — TELEPHONE ENCOUNTER
Please call and get appointment with me for chest pain and iron deficiency follow-up. Thank you. In 6 weeks.

## 2024-05-23 NOTE — TELEPHONE ENCOUNTER
Spoke with patient regarding scheduling appointment. Patient is scheduled on July 8th. Do you want  patient to start taking iron supplement?

## 2024-11-12 ENCOUNTER — NURSE TRIAGE (OUTPATIENT)
Dept: NURSING | Facility: CLINIC | Age: 39
End: 2024-11-12
Payer: COMMERCIAL

## 2024-11-13 NOTE — TELEPHONE ENCOUNTER
One and one half weeks, anxiety has been higher. Emotionally up and down. Feels like faint ache in sternum and tummy as well. Comes and goes throughout the day. Just eating now and felt nauseated. Should she get checked out? She declined me connecting her with scheduling to set up an appointment. She wanted to know if it's serious. I told her the guidelines are written by doctors and what she has going on recommends she be seen. They'll do the evaluating. She has no other questions.  Vicki Ryan RN  [facility]    Reason for Disposition   [1] MILD pain (e.g., does not interfere with normal activities) AND [2] comes and goes (cramps) AND [3] present > 72 hours  (Exception: This same abdominal pain is a chronic symptom recurrent or ongoing AND present > 4 weeks.)    Additional Information   Negative: SEVERE difficulty breathing (e.g., struggling for each breath, speaks in single words)   Negative: Shock suspected (e.g., cold/pale/clammy skin, too weak to stand, low BP, rapid pulse)   Negative: Difficult to awaken or acting confused (e.g., disoriented, slurred speech)   Negative: Passed out (i.e., lost consciousness, collapsed and was not responding)   Negative: Visible sweat on face or sweat dripping down face   Negative: Sounds like a life-threatening emergency to the triager   Negative: Followed an abdomen (stomach) injury   Negative: [1] SEVERE pain (e.g., excruciating) AND [2] present > 1 hour     Pain at 1   Negative: [1] Pain lasts > 10 minutes AND [2] age > 50   Negative: [1] Pain lasts > 10 minutes AND [2] age > 40 AND [3] associated chest, arm, neck, upper back or jaw pain   Negative: [1] Pain lasts > 10 minutes AND [2] age > 35 AND [3] at least one cardiac risk factor (e.g., diabetes, high cholesterol, hypertension, obesity, smoker or strong family history of heart disease)     Comes and goes, not more than ten minutes.   Negative: [1] Pain lasts > 10 minutes AND [2] history of heart disease  "(i.e., heart attack, bypass surgery, angina, angioplasty, CHF; not just a heart murmur)   Negative: [1] Pain lasts > 10 minutes AND [2] difficulty breathing   Negative: [1] Vomiting AND [2] contains red blood  (Exception: Few streaks and only occurred once.)   Negative: [1] Vomiting AND [2] contains black (\"coffee ground\") material   Negative: Blood in bowel movements  (Exception: Blood on surface of BM with constipation.)   Negative: Black or tarry bowel movements  (Exception: Chronic-unchanged black-grey BMs AND is taking iron pills or Pepto-Bismol.)   Negative: [1] Pregnant 20 or more weeks AND [2] new hand or face swelling   Negative: [1] Vomiting AND [2] contains bile (green color)   Negative: Patient sounds very sick or weak to the triager   Negative: [1] MILD-MODERATE pain AND [2] constant AND [3] present > 2 hours   Negative: [1] MILD-MODERATE pain AND [2] not relieved by antacid medicine   Negative: [1] Vomiting AND [2] abdomen looks much more swollen than usual   Negative: White of the eyes have turned yellow (i.e., jaundice)   Negative: Fever > 103 F (39.4 C)   Negative: [1] Fever > 101 F (38.3 C) AND [2] age > 60 years   Negative: [1] Fever > 100.0 F (37.8 C) AND [2] bedridden (e.g., CVA, chronic illness, recovering from surgery)   Negative: [1] Fever > 100.0 F (37.8 C) AND [2] diabetes mellitus or weak immune system (e.g., HIV positive, cancer chemo, splenectomy, organ transplant, chronic steroids)   Negative: [1] MODERATE pain (e.g., interferes with normal activities) AND [2] comes and goes (cramps) AND [3] present > 24 hours  (Exception: Pain with Vomiting or Diarrhea - see that Guideline.)    Protocols used: Abdominal Pain - Upper-A-AH    "

## 2025-04-19 ENCOUNTER — HEALTH MAINTENANCE LETTER (OUTPATIENT)
Age: 40
End: 2025-04-19